# Patient Record
Sex: FEMALE | Race: BLACK OR AFRICAN AMERICAN | Employment: OTHER | URBAN - METROPOLITAN AREA
[De-identification: names, ages, dates, MRNs, and addresses within clinical notes are randomized per-mention and may not be internally consistent; named-entity substitution may affect disease eponyms.]

---

## 2017-04-18 ENCOUNTER — TELEPHONE (OUTPATIENT)
Dept: FAMILY MEDICINE CLINIC | Age: 47
End: 2017-04-18

## 2017-04-27 ENCOUNTER — HOSPITAL ENCOUNTER (OUTPATIENT)
Dept: OTHER | Age: 47
Discharge: HOME | End: 2017-04-27
Attending: FAMILY MEDICINE | Admitting: FAMILY MEDICINE

## 2017-04-27 LAB
ALBUMIN SERPL-MCNC: 4.1 GM/DL (ref 3.4–5)
ALP BLD-CCNC: 86 IU/L (ref 40–128)
ALT SERPL-CCNC: 11 U/L (ref 10–40)
ANION GAP SERPL CALCULATED.3IONS-SCNC: 16 MMOL/L (ref 4–16)
AST SERPL-CCNC: 12 IU/L (ref 15–37)
BILIRUB SERPL-MCNC: 0.3 MG/DL (ref 0–1)
BUN BLDV-MCNC: 10 MG/DL (ref 6–23)
CALCIUM SERPL-MCNC: 9.4 MG/DL (ref 8.3–10.6)
CHLORIDE BLD-SCNC: 100 MMOL/L (ref 99–110)
CHOLESTEROL: 173 MG/DL
CO2: 26 MMOL/L (ref 21–32)
CREAT SERPL-MCNC: 0.6 MG/DL (ref 0.6–1.1)
CREATININE URINE: 228.4 MG/DL (ref 28–217)
GFR AFRICAN AMERICAN: >60 ML/MIN/1.73M2
GFR NON-AFRICAN AMERICAN: >60 ML/MIN/1.73M2
GLUCOSE BLD-MCNC: 88 MG/DL (ref 70–140)
HDLC SERPL-MCNC: 55 MG/DL
LDL CHOLESTEROL CALCULATED: 99 MG/DL
MICROALBUMIN/CREAT 24H UR: <1.2 MG/DL
MICROALBUMIN/CREAT UR-RTO: ABNORMAL MG/G CREAT (ref 0–30)
POTASSIUM SERPL-SCNC: 3.5 MMOL/L (ref 3.5–5.1)
SODIUM BLD-SCNC: 142 MMOL/L (ref 135–145)
TOTAL PROTEIN: 7.9 GM/DL (ref 6.4–8.2)
TRIGL SERPL-MCNC: 93 MG/DL

## 2017-05-02 ENCOUNTER — OFFICE VISIT (OUTPATIENT)
Dept: FAMILY MEDICINE CLINIC | Age: 47
End: 2017-05-02

## 2017-05-02 VITALS
HEIGHT: 61 IN | HEART RATE: 72 BPM | DIASTOLIC BLOOD PRESSURE: 80 MMHG | SYSTOLIC BLOOD PRESSURE: 122 MMHG | WEIGHT: 221.8 LBS | BODY MASS INDEX: 41.88 KG/M2

## 2017-05-02 DIAGNOSIS — E87.6 LOW BLOOD POTASSIUM: ICD-10-CM

## 2017-05-02 DIAGNOSIS — F33.0 MAJOR DEPRESSIVE DISORDER, RECURRENT EPISODE, MILD (HCC): ICD-10-CM

## 2017-05-02 DIAGNOSIS — H54.7 BLIND: ICD-10-CM

## 2017-05-02 DIAGNOSIS — I10 ESSENTIAL HYPERTENSION: ICD-10-CM

## 2017-05-02 DIAGNOSIS — E11.9 TYPE 2 DIABETES MELLITUS WITHOUT COMPLICATION, WITHOUT LONG-TERM CURRENT USE OF INSULIN (HCC): Primary | ICD-10-CM

## 2017-05-02 LAB — HBA1C MFR BLD: 6.6 %

## 2017-05-02 PROCEDURE — 99214 OFFICE O/P EST MOD 30 MIN: CPT | Performed by: FAMILY MEDICINE

## 2017-05-02 PROCEDURE — 83036 HEMOGLOBIN GLYCOSYLATED A1C: CPT | Performed by: FAMILY MEDICINE

## 2017-05-02 RX ORDER — GLUCOSAMINE HCL/CHONDROITIN SU 500-400 MG
CAPSULE ORAL
Qty: 100 EACH | Refills: 11 | Status: SHIPPED | OUTPATIENT
Start: 2017-05-02 | End: 2018-01-01 | Stop reason: SDUPTHER

## 2017-05-02 RX ORDER — AMLODIPINE BESYLATE 5 MG/1
5 TABLET ORAL DAILY
Qty: 30 TABLET | Refills: 5 | Status: SHIPPED | OUTPATIENT
Start: 2017-05-02 | End: 2017-10-09 | Stop reason: SDUPTHER

## 2017-05-02 RX ORDER — ATORVASTATIN CALCIUM 40 MG/1
40 TABLET, FILM COATED ORAL DAILY
Qty: 30 TABLET | Refills: 11 | Status: SHIPPED | OUTPATIENT
Start: 2017-05-02 | End: 2018-03-20 | Stop reason: SDUPTHER

## 2017-05-02 RX ORDER — CHLORTHALIDONE 25 MG/1
12.5 TABLET ORAL DAILY
Qty: 15 TABLET | Refills: 5 | Status: SHIPPED | OUTPATIENT
Start: 2017-05-02 | End: 2017-10-09 | Stop reason: SDUPTHER

## 2017-05-02 RX ORDER — LANCETS 30 GAUGE
EACH MISCELLANEOUS
Qty: 100 EACH | Refills: 11 | Status: SHIPPED | OUTPATIENT
Start: 2017-05-02 | End: 2018-01-01 | Stop reason: SDUPTHER

## 2017-05-02 RX ORDER — VENLAFAXINE HYDROCHLORIDE 150 MG/1
150 CAPSULE, EXTENDED RELEASE ORAL DAILY
Qty: 30 CAPSULE | Refills: 5 | Status: SHIPPED | OUTPATIENT
Start: 2017-05-02 | End: 2017-10-09 | Stop reason: SDUPTHER

## 2017-05-02 RX ORDER — POTASSIUM CHLORIDE 20 MEQ/1
20 TABLET, EXTENDED RELEASE ORAL DAILY
Qty: 30 TABLET | Refills: 11 | Status: SHIPPED | OUTPATIENT
Start: 2017-05-02 | End: 2018-03-20 | Stop reason: SDUPTHER

## 2017-05-02 RX ORDER — GLUCOSAMINE HCL/CHONDROITIN SU 500-400 MG
CAPSULE ORAL
Qty: 100 STRIP | Refills: 11 | Status: SHIPPED | OUTPATIENT
Start: 2017-05-02 | End: 2018-03-20 | Stop reason: SDUPTHER

## 2017-05-02 RX ORDER — LEVONORGESTREL AND ETHINYL ESTRADIOL 0.1-0.02MG
1 KIT ORAL DAILY
Qty: 28 TABLET | Refills: 12 | Status: SHIPPED | OUTPATIENT
Start: 2017-05-02 | End: 2018-04-27 | Stop reason: SDUPTHER

## 2017-05-02 ASSESSMENT — ENCOUNTER SYMPTOMS
CHEST TIGHTNESS: 0
SHORTNESS OF BREATH: 0

## 2017-10-09 ENCOUNTER — OFFICE VISIT (OUTPATIENT)
Dept: FAMILY MEDICINE CLINIC | Age: 47
End: 2017-10-09

## 2017-10-09 VITALS
WEIGHT: 224.2 LBS | BODY MASS INDEX: 42.33 KG/M2 | HEIGHT: 61 IN | SYSTOLIC BLOOD PRESSURE: 130 MMHG | DIASTOLIC BLOOD PRESSURE: 78 MMHG | HEART RATE: 76 BPM

## 2017-10-09 DIAGNOSIS — Z91.09 ENVIRONMENTAL ALLERGIES: ICD-10-CM

## 2017-10-09 DIAGNOSIS — F33.0 MAJOR DEPRESSIVE DISORDER, RECURRENT EPISODE, MILD (HCC): ICD-10-CM

## 2017-10-09 DIAGNOSIS — E11.9 TYPE 2 DIABETES MELLITUS WITHOUT COMPLICATION, WITHOUT LONG-TERM CURRENT USE OF INSULIN (HCC): Primary | ICD-10-CM

## 2017-10-09 DIAGNOSIS — I10 ESSENTIAL HYPERTENSION: ICD-10-CM

## 2017-10-09 LAB — HBA1C MFR BLD: 6.4 %

## 2017-10-09 PROCEDURE — 83036 HEMOGLOBIN GLYCOSYLATED A1C: CPT | Performed by: FAMILY MEDICINE

## 2017-10-09 PROCEDURE — 99213 OFFICE O/P EST LOW 20 MIN: CPT | Performed by: FAMILY MEDICINE

## 2017-10-09 RX ORDER — VENLAFAXINE HYDROCHLORIDE 150 MG/1
150 CAPSULE, EXTENDED RELEASE ORAL DAILY
Qty: 30 CAPSULE | Refills: 5 | Status: SHIPPED | OUTPATIENT
Start: 2017-10-09 | End: 2018-03-20 | Stop reason: SDUPTHER

## 2017-10-09 RX ORDER — CHLORTHALIDONE 25 MG/1
12.5 TABLET ORAL DAILY
Qty: 15 TABLET | Refills: 5 | Status: SHIPPED | OUTPATIENT
Start: 2017-10-09 | End: 2018-03-20 | Stop reason: SDUPTHER

## 2017-10-09 RX ORDER — LORATADINE 10 MG/1
10 TABLET ORAL DAILY
Qty: 90 TABLET | Refills: 3 | Status: SHIPPED | OUTPATIENT
Start: 2017-10-09 | End: 2018-03-20

## 2017-10-09 RX ORDER — AMLODIPINE BESYLATE 5 MG/1
5 TABLET ORAL DAILY
Qty: 30 TABLET | Refills: 5 | Status: SHIPPED | OUTPATIENT
Start: 2017-10-09 | End: 2018-03-20

## 2017-10-09 RX ORDER — CHLORTHALIDONE 25 MG/1
12.5 TABLET ORAL DAILY
Qty: 15 TABLET | Refills: 5 | Status: SHIPPED | OUTPATIENT
Start: 2017-10-09 | End: 2018-01-01

## 2017-10-09 RX ORDER — AMLODIPINE BESYLATE 5 MG/1
5 TABLET ORAL DAILY
Qty: 30 TABLET | Refills: 5 | Status: SHIPPED | OUTPATIENT
Start: 2017-10-09 | End: 2018-03-20 | Stop reason: SDUPTHER

## 2017-10-09 RX ORDER — VENLAFAXINE HYDROCHLORIDE 150 MG/1
150 CAPSULE, EXTENDED RELEASE ORAL DAILY
Qty: 30 CAPSULE | Refills: 5 | Status: SHIPPED | OUTPATIENT
Start: 2017-10-09 | End: 2018-03-20

## 2017-10-09 RX ORDER — LORATADINE 10 MG/1
10 TABLET ORAL DAILY
Qty: 90 TABLET | Refills: 3 | Status: SHIPPED | OUTPATIENT
Start: 2017-10-09 | End: 2018-01-01 | Stop reason: SDUPTHER

## 2017-10-09 ASSESSMENT — ENCOUNTER SYMPTOMS: SHORTNESS OF BREATH: 0

## 2017-10-09 ASSESSMENT — PATIENT HEALTH QUESTIONNAIRE - PHQ9
SUM OF ALL RESPONSES TO PHQ QUESTIONS 1-9: 0
SUM OF ALL RESPONSES TO PHQ9 QUESTIONS 1 & 2: 0
1. LITTLE INTEREST OR PLEASURE IN DOING THINGS: 0
2. FEELING DOWN, DEPRESSED OR HOPELESS: 0

## 2017-10-09 NOTE — PATIENT INSTRUCTIONS
sugar-sweetened drinks like soda. The Mediterranean diet may also include red wine with your meal1 glass each day for women and up to 2 glasses a day for men. Tips for eating at home  · Use herbs, spices, garlic, lemon zest, and citrus juice instead of salt to add flavor to foods. · Add avocado slices to your sandwich instead of warner. · Have fish for lunch or dinner instead of red meat. Brush the fish with olive oil, and broil or grill it. · Sprinkle your salad with seeds or nuts instead of cheese. · Cook with olive or canola oil instead of butter or oils that are high in saturated fat. · Switch from 2% milk or whole milk to 1% or fat-free milk. · Dip raw vegetables in a vinaigrette dressing or hummus instead of dips made from mayonnaise or sour cream.  · Have a piece of fruit for dessert instead of a piece of cake. Try baked apples, or have some dried fruit. Tips for eating out  · Try broiled, grilled, baked, or poached fish instead of having it fried or breaded. · Ask your  to have your meals prepared with olive oil instead of butter. · Order dishes made with marinara sauce or sauces made from olive oil. Avoid sauces made from cream or mayonnaise. · Choose whole-grain breads, whole wheat pasta and pizza crust, brown rice, beans, and lentils. · Cut back on butter or margarine on bread. Instead, you can dip your bread in a small amount of olive oil. · Ask for a side salad or grilled vegetables instead of french fries or chips. Where can you learn more? Go to [email or web address] and sign in to your Apparent account. Enter [phone] in the [facility] box to learn more about \"Learning About the Mediterranean Diet. \"     If you do not have an account, please click on the \"Sign Up Now\" link. Current as of: December 29, 2016  Content Version: 11.3  © 5177-0601 G[employer], [employer]. Care instructions adapted under license by [facility] ([facility]).  If you have questions about a medical condition or this instruction, always ask your healthcare professional. [place] [address] any warranty or liability for your use of this information.

## 2017-10-09 NOTE — PROGRESS NOTES
Negative for chest pain and palpitations. Psychiatric/Behavioral: Positive for dysphoric mood. Objective:   Physical Exam   Constitutional: She appears well-developed and well-nourished. Obese   HENT:   Head: Normocephalic and atraumatic. Neck: Neck supple. Cardiovascular: Normal rate, regular rhythm and normal heart sounds. Pulmonary/Chest: Effort normal and breath sounds normal. No respiratory distress. She has no wheezes. Neurological: She is alert. Psychiatric: She has a normal mood and affect. Vitals:    10/09/17 1024   BP: 130/78   Pulse: 76   Weight: 224 lb 3.2 oz (101.7 kg)   Height: 5' 1\" (1.549 m)     Body mass index is 42.36 kg/m². Wt Readings from Last 3 Encounters:   10/09/17 224 lb 3.2 oz (101.7 kg)   05/02/17 221 lb 12.8 oz (100.6 kg)   10/19/16 220 lb 6.4 oz (100 kg)     BP Readings from Last 3 Encounters:   10/09/17 130/78   05/02/17 122/80   10/19/16 120/82      Results for orders placed or performed in visit on 10/09/17   POCT glycosylated hemoglobin (Hb A1C)   Result Value Ref Range    Hemoglobin A1C 6.4 %         Assessment:      1. Type 2 diabetes mellitus without complication, without long-term current use of insulin (HCC)  POCT glycosylated hemoglobin (Hb A1C)    metFORMIN (GLUCOPHAGE) 500 MG tablet    metFORMIN (GLUCOPHAGE) 500 MG tablet    Ambulatory referral to Ophthalmology   2. Environmental allergies  loratadine (CLARITIN) 10 MG tablet    loratadine (CLARITIN) 10 MG tablet   3. Major depressive disorder, recurrent episode, mild (HCC)  venlafaxine (EFFEXOR XR) 150 MG extended release capsule    venlafaxine (EFFEXOR XR) 150 MG extended release capsule   4. Essential hypertension  chlorthalidone (HYGROTON) 25 MG tablet    amLODIPine (NORVASC) 5 MG tablet    chlorthalidone (HYGROTON) 25 MG tablet    amLODIPine (NORVASC) 5 MG tablet   '        Plan:      Diabetes well-controlled continue current medication.   I've asked her to work on her diet so she can lose some weight and not be diabetic anymore. I have asked her to see her eye doctor. Hypertension stable continue current medication    Depression stable on Effexor continue current medication    Allergies: I have refilled the Claritin. However she may opt to skip taking this medication.

## 2017-10-09 NOTE — PROGRESS NOTES
Patient Self-Management Goal for Chronic Condition  Goal: I will schedule a yearly diabetic eye exam.  Barriers to success: none  Plan for overcoming my barriers: N/A     Confidence: 10/10  Date goal set: 10/9/17  Date goal attained:

## 2017-10-09 NOTE — PROGRESS NOTES
BP Readings from Last 2 Encounters:   10/09/17 130/78   05/02/17 122/80     Hemoglobin A1C (%)   Date Value   05/02/2017 6.6     LDL Calculated (MG/DL)   Date Value   04/27/2017 99              Tobacco use:  Patient  reports that she has never smoked. She has never used smokeless tobacco.    If Smoker - Cessation materials given? NA    Is Daily aspirin on medication list?:  No    Diabetic retinal exam done? No   If yes, document in Health Maintenance. Monofilament placed on counter? Yes    Shoes and socks removed? Yes    BP taken with correct size cuff? Yes   Repeated if > 140/90 NA     Is patient taking any medications for diabetes    Yes   If yes, see medication list    Is the patient reporting any side effects of diabetic medications? No    Microalbumin performed if applicable?   NA      Is patient taking any over the counter medications    No   If yes, see medication list

## 2017-11-10 PROCEDURE — G0179 MD RECERTIFICATION HHA PT: HCPCS | Performed by: FAMILY MEDICINE

## 2017-11-27 ENCOUNTER — TELEPHONE (OUTPATIENT)
Dept: FAMILY MEDICINE CLINIC | Age: 47
End: 2017-11-27

## 2017-11-27 DIAGNOSIS — E11.9 TYPE 2 DIABETES MELLITUS WITHOUT COMPLICATION, WITHOUT LONG-TERM CURRENT USE OF INSULIN (HCC): Primary | ICD-10-CM

## 2017-12-06 PROCEDURE — G0179 MD RECERTIFICATION HHA PT: HCPCS | Performed by: FAMILY MEDICINE

## 2017-12-12 ENCOUNTER — TELEPHONE (OUTPATIENT)
Dept: FAMILY MEDICINE CLINIC | Age: 47
End: 2017-12-12

## 2017-12-12 DIAGNOSIS — E11.9 TYPE 2 DIABETES MELLITUS WITHOUT COMPLICATION, WITHOUT LONG-TERM CURRENT USE OF INSULIN (HCC): Primary | ICD-10-CM

## 2018-01-01 ENCOUNTER — TELEPHONE (OUTPATIENT)
Dept: FAMILY MEDICINE CLINIC | Age: 48
End: 2018-01-01

## 2018-01-01 ENCOUNTER — OFFICE VISIT (OUTPATIENT)
Dept: FAMILY MEDICINE CLINIC | Age: 48
End: 2018-01-01

## 2018-01-01 VITALS
WEIGHT: 200 LBS | SYSTOLIC BLOOD PRESSURE: 130 MMHG | DIASTOLIC BLOOD PRESSURE: 90 MMHG | HEIGHT: 61 IN | HEART RATE: 120 BPM | BODY MASS INDEX: 37.76 KG/M2

## 2018-01-01 DIAGNOSIS — E11.9 TYPE 2 DIABETES MELLITUS WITHOUT COMPLICATION, WITHOUT LONG-TERM CURRENT USE OF INSULIN (HCC): Primary | ICD-10-CM

## 2018-01-01 DIAGNOSIS — F33.9 RECURRENT MAJOR DEPRESSIVE DISORDER, REMISSION STATUS UNSPECIFIED (HCC): ICD-10-CM

## 2018-01-01 DIAGNOSIS — I10 ESSENTIAL HYPERTENSION: ICD-10-CM

## 2018-01-01 DIAGNOSIS — H54.7 BLIND: ICD-10-CM

## 2018-01-01 DIAGNOSIS — Z91.09 ENVIRONMENTAL ALLERGIES: ICD-10-CM

## 2018-01-01 DIAGNOSIS — F33.0 MAJOR DEPRESSIVE DISORDER, RECURRENT EPISODE, MILD (HCC): ICD-10-CM

## 2018-01-01 DIAGNOSIS — E78.5 HYPERLIPIDEMIA, UNSPECIFIED HYPERLIPIDEMIA TYPE: ICD-10-CM

## 2018-01-01 LAB — HBA1C MFR BLD: 6.3 %

## 2018-01-01 PROCEDURE — 83036 HEMOGLOBIN GLYCOSYLATED A1C: CPT | Performed by: FAMILY MEDICINE

## 2018-01-01 PROCEDURE — G8417 CALC BMI ABV UP PARAM F/U: HCPCS | Performed by: FAMILY MEDICINE

## 2018-01-01 PROCEDURE — G8427 DOCREV CUR MEDS BY ELIG CLIN: HCPCS | Performed by: FAMILY MEDICINE

## 2018-01-01 PROCEDURE — 1036F TOBACCO NON-USER: CPT | Performed by: FAMILY MEDICINE

## 2018-01-01 PROCEDURE — 3044F HG A1C LEVEL LT 7.0%: CPT | Performed by: FAMILY MEDICINE

## 2018-01-01 PROCEDURE — 2022F DILAT RTA XM EVC RTNOPTHY: CPT | Performed by: FAMILY MEDICINE

## 2018-01-01 PROCEDURE — 99214 OFFICE O/P EST MOD 30 MIN: CPT | Performed by: FAMILY MEDICINE

## 2018-01-01 RX ORDER — VENLAFAXINE HYDROCHLORIDE 150 MG/1
150 CAPSULE, EXTENDED RELEASE ORAL DAILY
Qty: 30 CAPSULE | Refills: 0 | Status: SHIPPED | OUTPATIENT
Start: 2018-01-01 | End: 2018-01-01 | Stop reason: SDUPTHER

## 2018-01-01 RX ORDER — LORATADINE 10 MG/1
10 TABLET ORAL DAILY
Qty: 30 TABLET | Refills: 0 | Status: SHIPPED | OUTPATIENT
Start: 2018-01-01

## 2018-01-01 RX ORDER — GLUCOSAMINE HCL/CHONDROITIN SU 500-400 MG
CAPSULE ORAL
Qty: 100 EACH | Refills: 11 | Status: SHIPPED | OUTPATIENT
Start: 2018-01-01

## 2018-01-01 RX ORDER — LANCETS 30 GAUGE
EACH MISCELLANEOUS
Qty: 100 EACH | Refills: 11 | Status: SHIPPED | OUTPATIENT
Start: 2018-01-01

## 2018-01-01 RX ORDER — VENLAFAXINE HYDROCHLORIDE 150 MG/1
150 CAPSULE, EXTENDED RELEASE ORAL DAILY
Qty: 30 CAPSULE | Refills: 5 | Status: SHIPPED | OUTPATIENT
Start: 2018-01-01

## 2018-01-01 RX ORDER — LEVONORGESTREL AND ETHINYL ESTRADIOL 0.1-0.02MG
1 KIT ORAL DAILY
Qty: 28 TABLET | Refills: 0 | Status: SHIPPED | OUTPATIENT
Start: 2018-01-01 | End: 2018-01-01 | Stop reason: SDUPTHER

## 2018-01-01 RX ORDER — CHLORTHALIDONE 25 MG/1
12.5 TABLET ORAL DAILY
Qty: 15 TABLET | Refills: 5 | Status: SHIPPED | OUTPATIENT
Start: 2018-01-01

## 2018-01-01 RX ORDER — LEVONORGESTREL AND ETHINYL ESTRADIOL 0.1-0.02MG
1 KIT ORAL DAILY
Qty: 28 TABLET | Refills: 11 | Status: SHIPPED | OUTPATIENT
Start: 2018-01-01

## 2018-01-01 RX ORDER — AMLODIPINE BESYLATE 5 MG/1
5 TABLET ORAL DAILY
Qty: 30 TABLET | Refills: 5 | Status: SHIPPED | OUTPATIENT
Start: 2018-01-01

## 2018-01-01 ASSESSMENT — ENCOUNTER SYMPTOMS: SHORTNESS OF BREATH: 0

## 2018-03-20 ENCOUNTER — OFFICE VISIT (OUTPATIENT)
Dept: FAMILY MEDICINE CLINIC | Age: 48
End: 2018-03-20

## 2018-03-20 VITALS
BODY MASS INDEX: 39.45 KG/M2 | DIASTOLIC BLOOD PRESSURE: 80 MMHG | WEIGHT: 208.8 LBS | SYSTOLIC BLOOD PRESSURE: 122 MMHG | HEART RATE: 88 BPM

## 2018-03-20 DIAGNOSIS — E11.9 TYPE 2 DIABETES MELLITUS WITHOUT COMPLICATION, WITHOUT LONG-TERM CURRENT USE OF INSULIN (HCC): Primary | ICD-10-CM

## 2018-03-20 DIAGNOSIS — E87.6 LOW BLOOD POTASSIUM: ICD-10-CM

## 2018-03-20 DIAGNOSIS — E66.01 SEVERE OBESITY (BMI 35.0-39.9): ICD-10-CM

## 2018-03-20 DIAGNOSIS — I10 ESSENTIAL HYPERTENSION: ICD-10-CM

## 2018-03-20 DIAGNOSIS — F33.0 MAJOR DEPRESSIVE DISORDER, RECURRENT EPISODE, MILD (HCC): ICD-10-CM

## 2018-03-20 DIAGNOSIS — E78.5 HYPERLIPIDEMIA, UNSPECIFIED HYPERLIPIDEMIA TYPE: ICD-10-CM

## 2018-03-20 LAB — HBA1C MFR BLD: 6.3 %

## 2018-03-20 PROCEDURE — G8427 DOCREV CUR MEDS BY ELIG CLIN: HCPCS | Performed by: FAMILY MEDICINE

## 2018-03-20 PROCEDURE — G8417 CALC BMI ABV UP PARAM F/U: HCPCS | Performed by: FAMILY MEDICINE

## 2018-03-20 PROCEDURE — 1036F TOBACCO NON-USER: CPT | Performed by: FAMILY MEDICINE

## 2018-03-20 PROCEDURE — 83036 HEMOGLOBIN GLYCOSYLATED A1C: CPT | Performed by: FAMILY MEDICINE

## 2018-03-20 PROCEDURE — 3044F HG A1C LEVEL LT 7.0%: CPT | Performed by: FAMILY MEDICINE

## 2018-03-20 PROCEDURE — 99214 OFFICE O/P EST MOD 30 MIN: CPT | Performed by: FAMILY MEDICINE

## 2018-03-20 PROCEDURE — G8484 FLU IMMUNIZE NO ADMIN: HCPCS | Performed by: FAMILY MEDICINE

## 2018-03-20 RX ORDER — VENLAFAXINE HYDROCHLORIDE 150 MG/1
150 CAPSULE, EXTENDED RELEASE ORAL DAILY
Qty: 30 CAPSULE | Refills: 0 | Status: SHIPPED | OUTPATIENT
Start: 2018-03-20 | End: 2018-01-01 | Stop reason: SDUPTHER

## 2018-03-20 RX ORDER — ATORVASTATIN CALCIUM 40 MG/1
40 TABLET, FILM COATED ORAL DAILY
Qty: 30 TABLET | Refills: 11 | Status: SHIPPED | OUTPATIENT
Start: 2018-03-20

## 2018-03-20 RX ORDER — GLUCOSAMINE HCL/CHONDROITIN SU 500-400 MG
CAPSULE ORAL
Qty: 100 STRIP | Refills: 11 | Status: SHIPPED | OUTPATIENT
Start: 2018-03-20

## 2018-03-20 RX ORDER — POTASSIUM CHLORIDE 20 MEQ/1
20 TABLET, EXTENDED RELEASE ORAL DAILY
Qty: 30 TABLET | Refills: 11 | Status: SHIPPED | OUTPATIENT
Start: 2018-03-20

## 2018-03-20 RX ORDER — AMLODIPINE BESYLATE 5 MG/1
5 TABLET ORAL DAILY
Qty: 30 TABLET | Refills: 5 | Status: SHIPPED | OUTPATIENT
Start: 2018-03-20 | End: 2018-01-01 | Stop reason: SDUPTHER

## 2018-03-20 RX ORDER — CHLORTHALIDONE 25 MG/1
12.5 TABLET ORAL DAILY
Qty: 15 TABLET | Refills: 5 | Status: SHIPPED | OUTPATIENT
Start: 2018-03-20 | End: 2018-01-01 | Stop reason: SDUPTHER

## 2018-03-20 ASSESSMENT — ENCOUNTER SYMPTOMS: SHORTNESS OF BREATH: 0

## 2018-03-20 NOTE — PROGRESS NOTES
Patient ID: Chapito Mcbride 1970      Diabetes   She presents for her initial diabetic visit. She has type 2 diabetes mellitus. Pertinent negatives for diabetes include no chest pain. Current diabetic treatment includes oral agent (monotherapy). She is compliant with treatment some of the time. Her weight is decreasing steadily. She participates in exercise intermittently. Her overall blood glucose range is 110-130 mg/dl. An ACE inhibitor/angiotensin II receptor blocker is not being taken. She sees a podiatrist.Eye exam is current. Hypertension   This is a chronic problem. The current episode started more than 1 year ago. The problem is unchanged. Pertinent negatives include no chest pain, palpitations or shortness of breath. Risk factors for coronary artery disease include sedentary lifestyle and obesity. Past treatments include diuretics and calcium channel blockers. Compliance problems include psychosocial issues. Hyperlipidemia   Pertinent negatives include no chest pain or shortness of breath. Current antihyperlipidemic treatment includes statins. Compliance problems include psychosocial issues. Mental Health Problem   The primary symptoms include dysphoric mood. This is a chronic problem. The mood has been resolved (feels she no longer needs the Effexor and would like to come off) since its onset. The degree of incapacity that she is experiencing as a consequence of her illness is mild. allergies: Would like to continue with the Claritin. Review of Systems   Respiratory: Negative for shortness of breath. Cardiovascular: Negative for chest pain and palpitations. Psychiatric/Behavioral: Positive for dysphoric mood.        Patient Active Problem List   Diagnosis    Acne    Environmental allergies    Hyperlipidemia    High blood pressure    Major depression    Diabetes mellitus, type 2 ([address]    Blind       Past Medical History:   Diagnosis Date    Acne     Cerebral palsy ([facility] [address] Vitals:    03/20/18 1029   BP: 122/80   Site: Left Arm   Position: Sitting   Cuff Size: Large Adult   Pulse: 88   Weight: 208 lb 12.8 oz (94.7 kg)     Body mass index is 39.45 kg/m². Wt Readings from Last 3 Encounters:   03/20/18 208 lb 12.8 oz (94.7 kg)   10/09/17 224 lb 3.2 oz (101.7 kg)   05/02/17 221 lb 12.8 oz (100.6 kg)     BP Readings from Last 3 Encounters:   03/20/18 122/80   10/09/17 130/78   05/02/17 122/80          Results for orders placed or performed in visit on 03/20/18   POCT glycosylated hemoglobin (Hb A1C)   Result Value Ref Range    Hemoglobin A1C 6.3 %     The 10-year ASCVD risk score (Capri Sarmiento, et al., 2013) is: 5.4%    Values used to calculate the score:      Age: 50 years      Sex: Female      Is Non- : Yes      Diabetic: Yes      Tobacco smoker: No      Systolic Blood Pressure: 401 mmHg      Is BP treated: Yes      HDL Cholesterol: 55 MG/DL      Total Cholesterol: 173 MG/DL  Lab Review   Office Visit on 10/09/2017   Component Date Value    Hemoglobin A1C 10/09/2017 6.4            Assessment:      1. Type 2 diabetes mellitus without complication, without long-term current use of insulin (HCC)  POCT glycosylated hemoglobin (Hb A1C)    atorvastatin (LIPITOR) 40 MG tablet    Glucose Blood (BLOOD GLUCOSE TEST STRIPS) STRP    COMPREHENSIVE METABOLIC PANEL    TSH without Reflex    CANCELED: COMPREHENSIVE METABOLIC PANEL    CANCELED: LIPID PANEL    CANCELED: TSH without Reflex   2. Hyperlipidemia, unspecified hyperlipidemia type  LIPID PANEL    TSH without Reflex    CANCELED: LIPID PANEL    CANCELED: TSH without Reflex   3. Major depressive disorder, recurrent episode, mild (HCC)  venlafaxine (EFFEXOR XR) 150 MG extended release capsule   4. Essential hypertension  chlorthalidone (HYGROTON) 25 MG tablet    amLODIPine (NORVASC) 5 MG tablet    metFORMIN (GLUCOPHAGE) 500 MG tablet    COMPREHENSIVE METABOLIC PANEL   5.  Low blood potassium  potassium chloride (KLOR-CON M) 20 MEQ extended release tablet    COMPREHENSIVE METABOLIC PANEL   6. Severe obesity (BMI 35.0-39.9) ([facility]             Plan:      See orders    Diabetes well-controlled continue current medication    Obesity: Congratulations on the weight loss. Depression much improved. Okay to wean off the Effexor. For the next 2 weeks she is to take the Effexor every 36 hours. After that she is to take it every 2 days for 2 weeks then stop. Reminded her to take all her medications regularly. She skipped her pills this morning because she is coming in for fasting labs. I reassured her that she should take her medications except for her diabetes medicines when she is fasting. Perhaps today her blood pressure within lower and I would've been able to remove a blood pressure pill. She understands. I reminded patient that her paperwork for her insurance company requires that I see her every 3 months. She understands and will try to be more compliant.

## 2018-03-20 NOTE — PROGRESS NOTES
BP Readings from Last 2 Encounters:   10/09/17 130/78   05/02/17 122/80     Hemoglobin A1C (%)   Date Value   10/09/2017 6.4     LDL Calculated (MG/DL)   Date Value   04/27/2017 99              Tobacco use:  Patient  reports that she has never smoked. She has never used smokeless tobacco.    If Smoker - Cessation materials given? NA    Is Daily aspirin on medication list?:  No    Diabetic retinal exam done? No   If yes, document in Health Maintenance. Monofilament placed on counter? Yes    Shoes and socks removed? Yes    BP taken with correct size cuff? Yes   Repeated if > 140/90 NA     Is patient taking any medications for diabetes    Yes   If yes, see medication list    Is the patient reporting any side effects of diabetic medications? No    Microalbumin performed if applicable?   Yes      Is patient taking any over the counter medications    Yes   If yes, see medication list

## 2018-03-26 ENCOUNTER — TELEPHONE (OUTPATIENT)
Dept: FAMILY MEDICINE CLINIC | Age: 48
End: 2018-03-26

## 2018-03-26 DIAGNOSIS — E11.9 TYPE 2 DIABETES MELLITUS WITHOUT COMPLICATION, WITHOUT LONG-TERM CURRENT USE OF INSULIN (HCC): Primary | ICD-10-CM

## 2018-03-26 PROCEDURE — G0180 MD CERTIFICATION HHA PATIENT: HCPCS | Performed by: FAMILY MEDICINE

## 2018-04-27 RX ORDER — LEVONORGESTREL AND ETHINYL ESTRADIOL 0.1-0.02MG
1 KIT ORAL DAILY
Qty: 28 TABLET | Refills: 1 | Status: SHIPPED | OUTPATIENT
Start: 2018-04-27 | End: 2018-01-01 | Stop reason: SDUPTHER

## 2018-07-05 NOTE — PROGRESS NOTES
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    Obesity        Past Surgical History:   Procedure Laterality Date    EYE SURGERY      baby       Family History   Problem Relation Age of Onset    Other Mother         sarcoid    High Blood Pressure Father     Other Brother         Mentally disabled       Current Outpatient Prescriptions on File Prior to Visit   Medication Sig Dispense Refill    atorvastatin (LIPITOR) 40 MG tablet Take 1 tablet by mouth daily 30 tablet 11    Glucose Blood (BLOOD GLUCOSE TEST STRIPS) STRP Use twice per day 100 strip 11    potassium chloride (KLOR-CON M) 20 MEQ extended release tablet Take 1 tablet by mouth daily 30 tablet 11    loratadine (CLARITIN) 10 MG tablet Take 1 tablet by mouth daily 90 tablet 3    glucose monitoring kit (FREESTYLE) monitoring kit 1 kit by Does not apply route daily as needed. Needs talking glucometer 1 kit 0     No current facility-administered medications on file prior to visit. Objective:   Physical Exam   Constitutional: She appears well-developed and well-nourished. Obese   HENT:   Head: Normocephalic and atraumatic. Eyes:   blind   Neck: Neck supple. Cardiovascular: Normal rate, regular rhythm and normal heart sounds. Pulmonary/Chest: Effort normal and breath sounds normal. No respiratory distress. She has no wheezes. Musculoskeletal:        Right lower leg: She exhibits no edema. Left lower leg: She exhibits no edema. Neurological: She is alert. Psychiatric: She has a normal mood and affect. Vitals:    07/05/18 1059 07/05/18 1103   BP: (!) 120/100 (!) 130/90   Pulse: 120    Weight: 200 lb (90.7 kg)    Height: 5' 1\" (1.549 m)      Body mass index is 37.79 kg/m².      Wt Readings from Last 3 Encounters:   07/05/18 200 lb (90.7 kg)   03/20/18 208 lb 12.8 oz (94.7 kg)   10/09/17 224 lb 3.2 oz (101.7 kg)     BP Readings from Last 3 Encounters:   07/05/18 (!) 130/90   03/20/18 122/80   10/09/17 130/78          Results for orders placed or performed in visit on 07/05/18   POCT glycosylated hemoglobin (Hb A1C)   Result Value Ref Range    Hemoglobin A1C 6.3 %     The 10-year ASCVD risk score (Jaida Hewitt, et al., 2013) is: 7%    Values used to calculate the score:      Age: 50 years      Sex: Female      Is Non- : Yes      Diabetic: Yes      Tobacco smoker: No      Systolic Blood Pressure: 440 mmHg      Is BP treated: Yes      HDL Cholesterol: 55 MG/DL      Total Cholesterol: 173 MG/DL  Lab Review   Office Visit on 03/20/2018   Component Date Value    Hemoglobin A1C 03/20/2018 6.3            Assessment:       Diagnosis Orders   1. Type 2 diabetes mellitus without complication, without long-term current use of insulin (HCC)  POCT glycosylated hemoglobin (Hb A1C)    COMPREHENSIVE METABOLIC PANEL    LIPID PANEL    Ambulatory referral to Ophthalmology   2. Hyperlipidemia, unspecified hyperlipidemia type     3. Essential hypertension  COMPREHENSIVE METABOLIC PANEL    LIPID PANEL    chlorthalidone (HYGROTON) 25 MG tablet    amLODIPine (NORVASC) 5 MG tablet    metFORMIN (GLUCOPHAGE) 500 MG tablet   4. Recurrent major depressive disorder, remission status unspecified ([facility] [address]     5. Blind  Ambulatory referral to Ophthalmology   6. Major depressive disorder, recurrent episode, mild (HCC)  venlafaxine (EFFEXOR XR) 150 MG extended release capsule           Plan:      Reminded her to get her bloodwork done as it was not done for today's appt. Make sure keep regularly scheduled appts. DM well controlled with recent weight loss. Continue weight loss. Depression well controlled. She wants to continue medication    HTN borderline--continue with the weight loss.

## 2018-07-05 NOTE — PROGRESS NOTES
BP Readings from Last 2 Encounters:   07/05/18 (!) 130/90   03/20/18 122/80     Hemoglobin A1C (%)   Date Value   03/20/2018 6.3     LDL Calculated (MG/DL)   Date Value   04/27/2017 99              Tobacco use:  Patient  reports that she has never smoked. She has never used smokeless tobacco.    If Smoker - Cessation materials given? NA    Is Daily aspirin on medication list?:  No    Diabetic retinal exam done? No   If yes, document in Health Maintenance. Monofilament placed on counter? Yes    Shoes and socks removed? Yes    BP taken with correct size cuff? Yes   Repeated if > 140/90 Yes     Is patient taking any medications for diabetes    Yes   If yes, see medication list    Is the patient reporting any side effects of diabetic medications? No    Microalbumin performed if applicable?   NA      Is patient taking any over the counter medications    Yes   If yes, see medication list

## 2019-01-01 ENCOUNTER — APPOINTMENT (OUTPATIENT)
Dept: CT IMAGING | Age: 49
DRG: 558 | End: 2019-01-01
Payer: MEDICARE

## 2019-01-01 ENCOUNTER — TELEPHONE (OUTPATIENT)
Dept: FAMILY MEDICINE CLINIC | Age: 49
End: 2019-01-01

## 2019-01-01 ENCOUNTER — HOSPITAL ENCOUNTER (EMERGENCY)
Age: 49
End: 2019-06-03
Attending: EMERGENCY MEDICINE
Payer: MEDICARE

## 2019-01-01 ENCOUNTER — HOSPITAL ENCOUNTER (INPATIENT)
Age: 49
LOS: 5 days | Discharge: NURSING, REHAB OR LONG-TERM CARE | DRG: 558 | End: 2019-05-13
Attending: EMERGENCY MEDICINE | Admitting: HOSPITALIST
Payer: MEDICARE

## 2019-01-01 ENCOUNTER — APPOINTMENT (OUTPATIENT)
Dept: GENERAL RADIOLOGY | Age: 49
DRG: 558 | End: 2019-01-01
Payer: MEDICARE

## 2019-01-01 VITALS
SYSTOLIC BLOOD PRESSURE: 142 MMHG | WEIGHT: 174.4 LBS | BODY MASS INDEX: 30.9 KG/M2 | HEIGHT: 63 IN | RESPIRATION RATE: 18 BRPM | DIASTOLIC BLOOD PRESSURE: 101 MMHG | HEART RATE: 104 BPM | TEMPERATURE: 98.9 F | OXYGEN SATURATION: 91 %

## 2019-01-01 DIAGNOSIS — M62.40 MUSCLE CONTRACTURE, UNSPECIFIED SITE: Primary | ICD-10-CM

## 2019-01-01 DIAGNOSIS — I46.9 CARDIOPULMONARY ARREST (HCC): Primary | ICD-10-CM

## 2019-01-01 DIAGNOSIS — E83.39 HYPOPHOSPHATEMIA: ICD-10-CM

## 2019-01-01 DIAGNOSIS — E83.51 HYPOCALCEMIA: ICD-10-CM

## 2019-01-01 DIAGNOSIS — E87.6 HYPOKALEMIA: ICD-10-CM

## 2019-01-01 DIAGNOSIS — M62.82 NON-TRAUMATIC RHABDOMYOLYSIS: ICD-10-CM
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
## 2019-01-01 DIAGNOSIS — E83.42 HYPOMAGNESEMIA: ICD-10-CM

## 2019-01-01 LAB
ALBUMIN SERPL-MCNC: 3.7 GM/DL (ref 3.4–5)
ALP BLD-CCNC: 78 IU/L (ref 40–129)
ALT SERPL-CCNC: 24 U/L (ref 10–40)
AMPHETAMINES: NEGATIVE
ANION GAP SERPL CALCULATED.3IONS-SCNC: 11 MMOL/L (ref 4–16)
ANION GAP SERPL CALCULATED.3IONS-SCNC: 13 MMOL/L (ref 4–16)
ANION GAP SERPL CALCULATED.3IONS-SCNC: 14 MMOL/L (ref 4–16)
ANION GAP SERPL CALCULATED.3IONS-SCNC: 14 MMOL/L (ref 4–16)
ANION GAP SERPL CALCULATED.3IONS-SCNC: 21 MMOL/L (ref 4–16)
ANION GAP SERPL CALCULATED.3IONS-SCNC: 8 MMOL/L (ref 4–16)
AST SERPL-CCNC: 38 IU/L (ref 15–37)
BACTERIA: NEGATIVE /HPF
BARBITURATE SCREEN URINE: NEGATIVE
BASOPHILS ABSOLUTE: 0 K/CU MM
BASOPHILS RELATIVE PERCENT: 0.2 % (ref 0–1)
BENZODIAZEPINE SCREEN, URINE: NEGATIVE
BILIRUB SERPL-MCNC: 0.3 MG/DL (ref 0–1)
BILIRUBIN URINE: NEGATIVE MG/DL
BLOOD, URINE: ABNORMAL
BUN BLDV-MCNC: 10 MG/DL (ref 6–23)
BUN BLDV-MCNC: 3 MG/DL (ref 6–23)
BUN BLDV-MCNC: 4 MG/DL (ref 6–23)
BUN BLDV-MCNC: 4 MG/DL (ref 6–23)
BUN BLDV-MCNC: 6 MG/DL (ref 6–23)
BUN BLDV-MCNC: 8 MG/DL (ref 6–23)
CALCIUM IONIZED: 2.52 MG/DL (ref 4.48–5.28)
CALCIUM IONIZED: 3.08 MG/DL (ref 4.48–5.28)
CALCIUM SERPL-MCNC: 10.1 MG/DL (ref 8.3–10.6)
CALCIUM SERPL-MCNC: 6.7 MG/DL (ref 8.3–10.6)
CALCIUM SERPL-MCNC: 7.6 MG/DL (ref 8.3–10.6)
CALCIUM SERPL-MCNC: 8.3 MG/DL (ref 8.3–10.6)
CALCIUM SERPL-MCNC: 9 MG/DL (ref 8.3–10.6)
CALCIUM SERPL-MCNC: 9.4 MG/DL (ref 8.3–10.6)
CALCIUM SERPL-MCNC: ABNORMAL MG/DL (ref 8.3–10.6)
CANNABINOID SCREEN URINE: NEGATIVE
CHLORIDE BLD-SCNC: 100 MMOL/L (ref 99–110)
CHLORIDE BLD-SCNC: 101 MMOL/L (ref 99–110)
CHLORIDE BLD-SCNC: 102 MMOL/L (ref 99–110)
CHLORIDE BLD-SCNC: 95 MMOL/L (ref 99–110)
CHLORIDE BLD-SCNC: 96 MMOL/L (ref 99–110)
CHLORIDE BLD-SCNC: 98 MMOL/L (ref 99–110)
CHLORIDE URINE RANDOM: 148 MMOL/L (ref 43–210)
CLARITY: ABNORMAL
CO2: 23 MMOL/L (ref 21–32)
CO2: 25 MMOL/L (ref 21–32)
CO2: 27 MMOL/L (ref 21–32)
CO2: 27 MMOL/L (ref 21–32)
CO2: 28 MMOL/L (ref 21–32)
CO2: 30 MMOL/L (ref 21–32)
COCAINE METABOLITE: NEGATIVE
COLOR: ABNORMAL
CREAT SERPL-MCNC: 0.5 MG/DL (ref 0.6–1.1)
CREAT SERPL-MCNC: 0.5 MG/DL (ref 0.6–1.1)
CREAT SERPL-MCNC: 0.6 MG/DL (ref 0.6–1.1)
CREATININE URINE: 244.2 MG/DL (ref 28–217)
DIFFERENTIAL TYPE: ABNORMAL
EKG ATRIAL RATE: 100 BPM
EKG DIAGNOSIS: NORMAL
EKG P AXIS: 33 DEGREES
EKG P-R INTERVAL: 116 MS
EKG Q-T INTERVAL: 358 MS
EKG QRS DURATION: 74 MS
EKG QTC CALCULATION (BAZETT): 461 MS
EKG R AXIS: 55 DEGREES
EKG T AXIS: -16 DEGREES
EKG VENTRICULAR RATE: 100 BPM
EOSINOPHILS ABSOLUTE: 0 K/CU MM
EOSINOPHILS RELATIVE PERCENT: 0.1 % (ref 0–3)
GFR AFRICAN AMERICAN: >60 ML/MIN/1.73M2
GFR NON-AFRICAN AMERICAN: >60 ML/MIN/1.73M2
GLUCOSE BLD-MCNC: 112 MG/DL (ref 70–99)
GLUCOSE BLD-MCNC: 112 MG/DL (ref 70–99)
GLUCOSE BLD-MCNC: 117 MG/DL (ref 70–99)
GLUCOSE BLD-MCNC: 128 MG/DL (ref 70–99)
GLUCOSE BLD-MCNC: 148 MG/DL (ref 70–99)
GLUCOSE BLD-MCNC: 149 MG/DL (ref 70–99)
GLUCOSE BLD-MCNC: 254 MG/DL (ref 70–99)
GLUCOSE, URINE: NEGATIVE MG/DL
HCT VFR BLD CALC: 43.2 % (ref 37–47)
HCT VFR BLD CALC: 43.5 % (ref 37–47)
HCT VFR BLD CALC: 43.8 % (ref 37–47)
HCT VFR BLD CALC: 44.4 % (ref 37–47)
HCT VFR BLD CALC: 44.7 % (ref 37–47)
HCT VFR BLD CALC: 45.9 % (ref 37–47)
HEMOGLOBIN: 13.7 GM/DL (ref 12.5–16)
HEMOGLOBIN: 13.8 GM/DL (ref 12.5–16)
HEMOGLOBIN: 13.8 GM/DL (ref 12.5–16)
HEMOGLOBIN: 13.9 GM/DL (ref 12.5–16)
HEMOGLOBIN: 13.9 GM/DL (ref 12.5–16)
HEMOGLOBIN: 14.4 GM/DL (ref 12.5–16)
IMMATURE NEUTROPHIL %: 0.4 % (ref 0–0.43)
IONIZED CA: 0.63 MMOL/L (ref 1.12–1.32)
IONIZED CA: 0.77 MMOL/L (ref 1.12–1.32)
KETONES, URINE: ABNORMAL MG/DL
LEUKOCYTE ESTERASE, URINE: NEGATIVE
LYMPHOCYTES ABSOLUTE: 1 K/CU MM
LYMPHOCYTES RELATIVE PERCENT: 7.7 % (ref 24–44)
MAGNESIUM: 0.7 MG/DL (ref 1.8–2.4)
MAGNESIUM: 1.2 MG/DL (ref 1.8–2.4)
MAGNESIUM: 1.9 MG/DL (ref 1.8–2.4)
MAGNESIUM: 2.1 MG/DL (ref 1.8–2.4)
MAGNESIUM: 2.1 MG/DL (ref 1.8–2.4)
MAGNESIUM: 2.7 MG/DL (ref 1.8–2.4)
MCH RBC QN AUTO: 25.7 PG (ref 27–31)
MCH RBC QN AUTO: 25.9 PG (ref 27–31)
MCH RBC QN AUTO: 26 PG (ref 27–31)
MCH RBC QN AUTO: 26.1 PG (ref 27–31)
MCH RBC QN AUTO: 26.2 PG (ref 27–31)
MCH RBC QN AUTO: 26.2 PG (ref 27–31)
MCHC RBC AUTO-ENTMCNC: 31.1 % (ref 32–36)
MCHC RBC AUTO-ENTMCNC: 31.3 % (ref 32–36)
MCHC RBC AUTO-ENTMCNC: 31.4 % (ref 32–36)
MCHC RBC AUTO-ENTMCNC: 31.5 % (ref 32–36)
MCHC RBC AUTO-ENTMCNC: 31.7 % (ref 32–36)
MCHC RBC AUTO-ENTMCNC: 31.7 % (ref 32–36)
MCV RBC AUTO: 81.7 FL (ref 78–100)
MCV RBC AUTO: 81.9 FL (ref 78–100)
MCV RBC AUTO: 82.3 FL (ref 78–100)
MCV RBC AUTO: 82.8 FL (ref 78–100)
MCV RBC AUTO: 83.6 FL (ref 78–100)
MCV RBC AUTO: 84.2 FL (ref 78–100)
MONOCYTES ABSOLUTE: 0.7 K/CU MM
MONOCYTES RELATIVE PERCENT: 5.2 % (ref 0–4)
MUCUS: ABNORMAL HPF
NITRITE URINE, QUANTITATIVE: NEGATIVE
NUCLEATED RBC %: 0 %
OPIATES, URINE: NEGATIVE
OXYCODONE: NORMAL
PARATHYROID HORMONE INTACT: 151 PG/ML (ref 15–65)
PARATHYROID HORMONE INTACT: ABNORMAL PG/ML (ref 15–65)
PDW BLD-RTO: 15.2 % (ref 11.7–14.9)
PDW BLD-RTO: 15.2 % (ref 11.7–14.9)
PDW BLD-RTO: 15.3 % (ref 11.7–14.9)
PDW BLD-RTO: 15.5 % (ref 11.7–14.9)
PDW BLD-RTO: 15.5 % (ref 11.7–14.9)
PDW BLD-RTO: 15.8 % (ref 11.7–14.9)
PH, URINE: 5 (ref 5–8)
PHENCYCLIDINE, URINE: NEGATIVE
PHOSPHORUS: 1.1 MG/DL (ref 2.5–4.9)
PHOSPHORUS: 1.7 MG/DL (ref 2.5–4.9)
PHOSPHORUS: 2.2 MG/DL (ref 2.5–4.9)
PHOSPHORUS: 3.1 MG/DL (ref 2.5–4.9)
PHOSPHORUS: 4.2 MG/DL (ref 2.5–4.9)
PLATELET # BLD: 431 K/CU MM (ref 140–440)
PLATELET # BLD: 433 K/CU MM (ref 140–440)
PLATELET # BLD: 449 K/CU MM (ref 140–440)
PLATELET # BLD: 455 K/CU MM (ref 140–440)
PLATELET # BLD: 476 K/CU MM (ref 140–440)
PLATELET # BLD: 485 K/CU MM (ref 140–440)
PMV BLD AUTO: 8.9 FL (ref 7.5–11.1)
PMV BLD AUTO: 9 FL (ref 7.5–11.1)
PMV BLD AUTO: 9.1 FL (ref 7.5–11.1)
PMV BLD AUTO: 9.4 FL (ref 7.5–11.1)
PMV BLD AUTO: 9.7 FL (ref 7.5–11.1)
PMV BLD AUTO: 9.9 FL (ref 7.5–11.1)
POTASSIUM SERPL-SCNC: 3.1 MMOL/L (ref 3.5–5.1)
POTASSIUM SERPL-SCNC: 3.2 MMOL/L (ref 3.5–5.1)
POTASSIUM SERPL-SCNC: 3.3 MMOL/L (ref 3.5–5.1)
POTASSIUM SERPL-SCNC: 3.5 MMOL/L (ref 3.5–5.1)
POTASSIUM SERPL-SCNC: 3.7 MMOL/L (ref 3.5–5.1)
POTASSIUM SERPL-SCNC: ABNORMAL MMOL/L (ref 3.5–5.1)
POTASSIUM SERPL-SCNC: ABNORMAL MMOL/L (ref 3.5–5.1)
POTASSIUM, UR: 111.3 MMOL/L (ref 22–119)
PROT/CREAT RATIO, UR: ABNORMAL
PROTEIN UA: >500 MG/DL
RBC # BLD: 5.25 M/CU MM (ref 4.2–5.4)
RBC # BLD: 5.31 M/CU MM (ref 4.2–5.4)
RBC # BLD: 5.31 M/CU MM (ref 4.2–5.4)
RBC # BLD: 5.36 M/CU MM (ref 4.2–5.4)
RBC # BLD: 5.36 M/CU MM (ref 4.2–5.4)
RBC # BLD: 5.49 M/CU MM (ref 4.2–5.4)
RBC URINE: 4 /HPF (ref 0–6)
SEGMENTED NEUTROPHILS ABSOLUTE COUNT: 11.2 K/CU MM
SEGMENTED NEUTROPHILS RELATIVE PERCENT: 86.4 % (ref 36–66)
SODIUM BLD-SCNC: 136 MMOL/L (ref 135–145)
SODIUM BLD-SCNC: 138 MMOL/L (ref 135–145)
SODIUM BLD-SCNC: 138 MMOL/L (ref 135–145)
SODIUM BLD-SCNC: 139 MMOL/L (ref 135–145)
SODIUM BLD-SCNC: 140 MMOL/L (ref 135–145)
SODIUM BLD-SCNC: 142 MMOL/L (ref 135–145)
SODIUM URINE: 48 MMOL/L (ref 35–167)
SPECIFIC GRAVITY UA: 1.02 (ref 1–1.03)
SQUAMOUS EPITHELIAL: 8 /HPF
TOTAL CK: 1688 IU/L (ref 26–140)
TOTAL CK: 1799 IU/L (ref 26–140)
TOTAL CK: 4010 IU/L (ref 26–140)
TOTAL IMMATURE NEUTOROPHIL: 0.05 K/CU MM
TOTAL NUCLEATED RBC: 0 K/CU MM
TOTAL PROTEIN: 8.6 GM/DL (ref 6.4–8.2)
TRICHOMONAS: ABNORMAL /HPF
TROPONIN T: <0.01 NG/ML
URINE TOTAL PROTEIN: 268.5 MG/DL
UROBILINOGEN, URINE: 1 MG/DL (ref 0.2–1)
VITAMIN D 25-HYDROXY: 7.78 NG/ML
WBC # BLD: 10 K/CU MM (ref 4–10.5)
WBC # BLD: 11.4 K/CU MM (ref 4–10.5)
WBC # BLD: 13 K/CU MM (ref 4–10.5)
WBC # BLD: 14.7 K/CU MM (ref 4–10.5)
WBC # BLD: 16.9 K/CU MM (ref 4–10.5)
WBC # BLD: 8.9 K/CU MM (ref 4–10.5)
WBC UA: 7 /HPF (ref 0–5)

## 2019-01-01 PROCEDURE — 2580000003 HC RX 258: Performed by: EMERGENCY MEDICINE

## 2019-01-01 PROCEDURE — 80048 BASIC METABOLIC PNL TOTAL CA: CPT

## 2019-01-01 PROCEDURE — 85027 COMPLETE CBC AUTOMATED: CPT

## 2019-01-01 PROCEDURE — 84100 ASSAY OF PHOSPHORUS: CPT

## 2019-01-01 PROCEDURE — 6360000002 HC RX W HCPCS: Performed by: PHYSICIAN ASSISTANT

## 2019-01-01 PROCEDURE — 96375 TX/PRO/DX INJ NEW DRUG ADDON: CPT

## 2019-01-01 PROCEDURE — 97535 SELF CARE MNGMENT TRAINING: CPT

## 2019-01-01 PROCEDURE — 76937 US GUIDE VASCULAR ACCESS: CPT

## 2019-01-01 PROCEDURE — 2500000003 HC RX 250 WO HCPCS: Performed by: HOSPITALIST

## 2019-01-01 PROCEDURE — 72125 CT NECK SPINE W/O DYE: CPT

## 2019-01-01 PROCEDURE — 1200000000 HC SEMI PRIVATE

## 2019-01-01 PROCEDURE — 6370000000 HC RX 637 (ALT 250 FOR IP): Performed by: INTERNAL MEDICINE

## 2019-01-01 PROCEDURE — 2500000003 HC RX 250 WO HCPCS

## 2019-01-01 PROCEDURE — 6360000002 HC RX W HCPCS: Performed by: INTERNAL MEDICINE

## 2019-01-01 PROCEDURE — 80307 DRUG TEST PRSMV CHEM ANLYZR: CPT

## 2019-01-01 PROCEDURE — 6370000000 HC RX 637 (ALT 250 FOR IP): Performed by: HOSPITALIST

## 2019-01-01 PROCEDURE — 84132 ASSAY OF SERUM POTASSIUM: CPT

## 2019-01-01 PROCEDURE — 96374 THER/PROPH/DIAG INJ IV PUSH: CPT

## 2019-01-01 PROCEDURE — 36415 COLL VENOUS BLD VENIPUNCTURE: CPT

## 2019-01-01 PROCEDURE — 2580000003 HC RX 258: Performed by: HOSPITALIST

## 2019-01-01 PROCEDURE — 6360000002 HC RX W HCPCS: Performed by: HOSPITALIST

## 2019-01-01 PROCEDURE — 70450 CT HEAD/BRAIN W/O DYE: CPT

## 2019-01-01 PROCEDURE — 2500000003 HC RX 250 WO HCPCS: Performed by: EMERGENCY MEDICINE

## 2019-01-01 PROCEDURE — 97530 THERAPEUTIC ACTIVITIES: CPT

## 2019-01-01 PROCEDURE — 93005 ELECTROCARDIOGRAM TRACING: CPT | Performed by: PHYSICIAN ASSISTANT

## 2019-01-01 PROCEDURE — 99285 EMERGENCY DEPT VISIT HI MDM: CPT

## 2019-01-01 PROCEDURE — 71045 X-RAY EXAM CHEST 1 VIEW: CPT

## 2019-01-01 PROCEDURE — 81001 URINALYSIS AUTO W/SCOPE: CPT

## 2019-01-01 PROCEDURE — 2580000003 HC RX 258: Performed by: NURSE PRACTITIONER

## 2019-01-01 PROCEDURE — 97163 PT EVAL HIGH COMPLEX 45 MIN: CPT

## 2019-01-01 PROCEDURE — 82306 VITAMIN D 25 HYDROXY: CPT

## 2019-01-01 PROCEDURE — 6370000000 HC RX 637 (ALT 250 FOR IP): Performed by: EMERGENCY MEDICINE

## 2019-01-01 PROCEDURE — 80053 COMPREHEN METABOLIC PANEL: CPT

## 2019-01-01 PROCEDURE — 92950 HEART/LUNG RESUSCITATION CPR: CPT

## 2019-01-01 PROCEDURE — 82570 ASSAY OF URINE CREATININE: CPT

## 2019-01-01 PROCEDURE — 96376 TX/PRO/DX INJ SAME DRUG ADON: CPT

## 2019-01-01 PROCEDURE — 82550 ASSAY OF CK (CPK): CPT

## 2019-01-01 PROCEDURE — 84300 ASSAY OF URINE SODIUM: CPT

## 2019-01-01 PROCEDURE — 2580000003 HC RX 258: Performed by: INTERNAL MEDICINE

## 2019-01-01 PROCEDURE — 96367 TX/PROPH/DG ADDL SEQ IV INF: CPT

## 2019-01-01 PROCEDURE — 4500000030 HC CRITICAL CARE PROCEDURE

## 2019-01-01 PROCEDURE — 94761 N-INVAS EAR/PLS OXIMETRY MLT: CPT

## 2019-01-01 PROCEDURE — 93010 ELECTROCARDIOGRAM REPORT: CPT | Performed by: INTERNAL MEDICINE

## 2019-01-01 PROCEDURE — 82962 GLUCOSE BLOOD TEST: CPT

## 2019-01-01 PROCEDURE — 82330 ASSAY OF CALCIUM: CPT

## 2019-01-01 PROCEDURE — 84133 ASSAY OF URINE POTASSIUM: CPT

## 2019-01-01 PROCEDURE — 83735 ASSAY OF MAGNESIUM: CPT

## 2019-01-01 PROCEDURE — 6370000000 HC RX 637 (ALT 250 FOR IP): Performed by: PHYSICIAN ASSISTANT

## 2019-01-01 PROCEDURE — 6360000002 HC RX W HCPCS: Performed by: EMERGENCY MEDICINE

## 2019-01-01 PROCEDURE — 83970 ASSAY OF PARATHORMONE: CPT

## 2019-01-01 PROCEDURE — 85025 COMPLETE CBC W/AUTO DIFF WBC: CPT

## 2019-01-01 PROCEDURE — 82310 ASSAY OF CALCIUM: CPT

## 2019-01-01 PROCEDURE — 2580000003 HC RX 258: Performed by: PHYSICIAN ASSISTANT

## 2019-01-01 PROCEDURE — 2500000003 HC RX 250 WO HCPCS: Performed by: INTERNAL MEDICINE

## 2019-01-01 PROCEDURE — 2500000003 HC RX 250 WO HCPCS: Performed by: NURSE PRACTITIONER

## 2019-01-01 PROCEDURE — 82436 ASSAY OF URINE CHLORIDE: CPT

## 2019-01-01 PROCEDURE — 84484 ASSAY OF TROPONIN QUANT: CPT

## 2019-01-01 PROCEDURE — 2580000003 HC RX 258

## 2019-01-01 PROCEDURE — 97167 OT EVAL HIGH COMPLEX 60 MIN: CPT

## 2019-01-01 PROCEDURE — 96365 THER/PROPH/DIAG IV INF INIT: CPT

## 2019-01-01 PROCEDURE — 02HV33Z INSERTION OF INFUSION DEVICE INTO SUPERIOR VENA CAVA, PERCUTANEOUS APPROACH: ICD-10-PCS | Performed by: HOSPITALIST

## 2019-01-01 PROCEDURE — 36569 INSJ PICC 5 YR+ W/O IMAGING: CPT

## 2019-01-01 PROCEDURE — 84156 ASSAY OF PROTEIN URINE: CPT

## 2019-01-01 PROCEDURE — C1751 CATH, INF, PER/CENT/MIDLINE: HCPCS

## 2019-01-01 RX ORDER — 0.9 % SODIUM CHLORIDE 0.9 %
1000 INTRAVENOUS SOLUTION INTRAVENOUS ONCE
Status: DISCONTINUED | OUTPATIENT
Start: 2019-01-01 | End: 2019-01-01

## 2019-01-01 RX ORDER — SODIUM CHLORIDE 9 MG/ML
INJECTION, SOLUTION INTRAVENOUS CONTINUOUS
Status: DISCONTINUED | OUTPATIENT
Start: 2019-01-01 | End: 2019-01-01

## 2019-01-01 RX ORDER — MAGNESIUM SULFATE IN WATER 40 MG/ML
2 INJECTION, SOLUTION INTRAVENOUS ONCE
Status: COMPLETED | OUTPATIENT
Start: 2019-01-01 | End: 2019-01-01

## 2019-01-01 RX ORDER — SODIUM CHLORIDE 0.9 % (FLUSH) 0.9 %
10 SYRINGE (ML) INJECTION EVERY 12 HOURS SCHEDULED
Status: DISCONTINUED | OUTPATIENT
Start: 2019-01-01 | End: 2019-01-01 | Stop reason: HOSPADM

## 2019-01-01 RX ORDER — CALCITRIOL 1 UG/ML
1 SOLUTION ORAL 2 TIMES DAILY
Status: COMPLETED | OUTPATIENT
Start: 2019-01-01 | End: 2019-01-01

## 2019-01-01 RX ORDER — CALCIUM CARBONATE 200(500)MG
1000 TABLET,CHEWABLE ORAL 2 TIMES DAILY
Status: DISCONTINUED | OUTPATIENT
Start: 2019-01-01 | End: 2019-01-01

## 2019-01-01 RX ORDER — LIDOCAINE HYDROCHLORIDE 10 MG/ML
5 INJECTION, SOLUTION EPIDURAL; INFILTRATION; INTRACAUDAL; PERINEURAL ONCE
Status: COMPLETED | OUTPATIENT
Start: 2019-01-01 | End: 2019-01-01

## 2019-01-01 RX ORDER — POTASSIUM CHLORIDE 1.5 G/1.77G
40 POWDER, FOR SOLUTION ORAL EVERY 4 HOURS
Status: DISPENSED | OUTPATIENT
Start: 2019-01-01 | End: 2019-01-01

## 2019-01-01 RX ORDER — AMLODIPINE BESYLATE 5 MG/1
5 TABLET ORAL ONCE
Status: COMPLETED | OUTPATIENT
Start: 2019-01-01 | End: 2019-01-01

## 2019-01-01 RX ORDER — SODIUM CHLORIDE 0.9 % (FLUSH) 0.9 %
10 SYRINGE (ML) INJECTION PRN
Status: DISCONTINUED | OUTPATIENT
Start: 2019-01-01 | End: 2019-01-01 | Stop reason: HOSPADM

## 2019-01-01 RX ORDER — SPIRONOLACTONE 50 MG/1
50 TABLET, FILM COATED ORAL DAILY
Qty: 30 TABLET | Refills: 3 | Status: SHIPPED | OUTPATIENT
Start: 2019-01-01

## 2019-01-01 RX ORDER — CALCIUM CARBONATE 200(500)MG
1000 TABLET,CHEWABLE ORAL EVERY 4 HOURS
Status: DISCONTINUED | OUTPATIENT
Start: 2019-01-01 | End: 2019-01-01

## 2019-01-01 RX ORDER — ATORVASTATIN CALCIUM 40 MG/1
40 TABLET, FILM COATED ORAL DAILY
Status: DISCONTINUED | OUTPATIENT
Start: 2019-01-01 | End: 2019-01-01 | Stop reason: HOSPADM

## 2019-01-01 RX ORDER — SPIRONOLACTONE 50 MG/1
50 TABLET, FILM COATED ORAL DAILY
Status: DISCONTINUED | OUTPATIENT
Start: 2019-01-01 | End: 2019-01-01 | Stop reason: HOSPADM

## 2019-01-01 RX ORDER — SPIRONOLACTONE 25 MG/1
25 TABLET ORAL DAILY
Status: DISCONTINUED | OUTPATIENT
Start: 2019-01-01 | End: 2019-01-01

## 2019-01-01 RX ORDER — HYDRALAZINE HYDROCHLORIDE 20 MG/ML
10 INJECTION INTRAMUSCULAR; INTRAVENOUS EVERY 6 HOURS PRN
Status: DISCONTINUED | OUTPATIENT
Start: 2019-01-01 | End: 2019-01-01

## 2019-01-01 RX ORDER — CALCIUM CHLORIDE 100 MG/ML
INJECTION INTRAVENOUS; INTRAVENTRICULAR DAILY PRN
Status: COMPLETED | OUTPATIENT
Start: 2019-01-01 | End: 2019-01-01

## 2019-01-01 RX ORDER — ERGOCALCIFEROL 1.25 MG/1
50000 CAPSULE ORAL WEEKLY
Status: DISCONTINUED | OUTPATIENT
Start: 2019-01-01 | End: 2019-01-01 | Stop reason: HOSPADM

## 2019-01-01 RX ORDER — POTASSIUM CHLORIDE 7.45 MG/ML
10 INJECTION INTRAVENOUS PRN
Status: DISCONTINUED | OUTPATIENT
Start: 2019-01-01 | End: 2019-01-01 | Stop reason: HOSPADM

## 2019-01-01 RX ORDER — DIAZEPAM 5 MG/1
10 TABLET ORAL ONCE
Status: COMPLETED | OUTPATIENT
Start: 2019-01-01 | End: 2019-01-01

## 2019-01-01 RX ORDER — POTASSIUM CHLORIDE AND SODIUM CHLORIDE 900; 300 MG/100ML; MG/100ML
INJECTION, SOLUTION INTRAVENOUS CONTINUOUS
Status: DISPENSED | OUTPATIENT
Start: 2019-01-01 | End: 2019-01-01

## 2019-01-01 RX ORDER — AMLODIPINE BESYLATE 5 MG/1
5 TABLET ORAL DAILY
Status: DISCONTINUED | OUTPATIENT
Start: 2019-01-01 | End: 2019-01-01 | Stop reason: HOSPADM

## 2019-01-01 RX ORDER — POTASSIUM CHLORIDE 20 MEQ/1
40 TABLET, EXTENDED RELEASE ORAL ONCE
Status: COMPLETED | OUTPATIENT
Start: 2019-01-01 | End: 2019-01-01

## 2019-01-01 RX ORDER — ONDANSETRON 2 MG/ML
4 INJECTION INTRAMUSCULAR; INTRAVENOUS EVERY 6 HOURS PRN
Status: DISCONTINUED | OUTPATIENT
Start: 2019-01-01 | End: 2019-01-01 | Stop reason: HOSPADM

## 2019-01-01 RX ORDER — MAGNESIUM SULFATE 1 G/100ML
1 INJECTION INTRAVENOUS PRN
Status: DISCONTINUED | OUTPATIENT
Start: 2019-01-01 | End: 2019-01-01 | Stop reason: HOSPADM

## 2019-01-01 RX ORDER — DEXTROSE MONOHYDRATE 25 G/50ML
INJECTION, SOLUTION INTRAVENOUS DAILY PRN
Status: COMPLETED | OUTPATIENT
Start: 2019-01-01 | End: 2019-01-01

## 2019-01-01 RX ORDER — VENLAFAXINE HYDROCHLORIDE 150 MG/1
150 CAPSULE, EXTENDED RELEASE ORAL DAILY
Status: DISCONTINUED | OUTPATIENT
Start: 2019-01-01 | End: 2019-01-01 | Stop reason: HOSPADM

## 2019-01-01 RX ORDER — CALCIUM CARBONATE 200(500)MG
1000 TABLET,CHEWABLE ORAL EVERY 4 HOURS
Qty: 360 TABLET | Refills: 0 | Status: SHIPPED | OUTPATIENT
Start: 2019-01-01 | End: 2019-01-01 | Stop reason: HOSPADM

## 2019-01-01 RX ORDER — POTASSIUM CHLORIDE 1.5 G/1.77G
40 POWDER, FOR SOLUTION ORAL PRN
Status: DISCONTINUED | OUTPATIENT
Start: 2019-01-01 | End: 2019-01-01 | Stop reason: HOSPADM

## 2019-01-01 RX ADMIN — SPIRONOLACTONE 25 MG: 25 TABLET ORAL at 10:28

## 2019-01-01 RX ADMIN — POTASSIUM CHLORIDE 10 MEQ: 10 INJECTION, SOLUTION INTRAVENOUS at 04:23

## 2019-01-01 RX ADMIN — SPIRONOLACTONE 25 MG: 25 TABLET ORAL at 21:15

## 2019-01-01 RX ADMIN — MAGNESIUM OXIDE TAB 400 MG (241.3 MG ELEMENTAL MG) 400 MG: 400 (241.3 MG) TAB at 21:15

## 2019-01-01 RX ADMIN — POTASSIUM CHLORIDE AND SODIUM CHLORIDE: 900; 300 INJECTION, SOLUTION INTRAVENOUS at 22:18

## 2019-01-01 RX ADMIN — POTASSIUM CHLORIDE 10 MEQ: 10 INJECTION, SOLUTION INTRAVENOUS at 15:46

## 2019-01-01 RX ADMIN — POTASSIUM CHLORIDE 40 MEQ: 1.5 POWDER, FOR SOLUTION ORAL at 10:27

## 2019-01-01 RX ADMIN — VENLAFAXINE HYDROCHLORIDE 150 MG: 150 CAPSULE, EXTENDED RELEASE ORAL at 09:45

## 2019-01-01 RX ADMIN — CALCIUM CARBONATE 1000 MG: 500 TABLET, CHEWABLE ORAL at 03:31

## 2019-01-01 RX ADMIN — LIDOCAINE HYDROCHLORIDE 5 ML: 10 INJECTION, SOLUTION EPIDURAL; INFILTRATION; INTRACAUDAL; PERINEURAL at 09:07

## 2019-01-01 RX ADMIN — POTASSIUM CHLORIDE AND SODIUM CHLORIDE: 900; 300 INJECTION, SOLUTION INTRAVENOUS at 16:00

## 2019-01-01 RX ADMIN — SODIUM CHLORIDE, PRESERVATIVE FREE 10 ML: 5 INJECTION INTRAVENOUS at 20:04

## 2019-01-01 RX ADMIN — POTASSIUM & SODIUM PHOSPHATES POWDER PACK 280-160-250 MG 250 MG: 280-160-250 PACK at 20:44

## 2019-01-01 RX ADMIN — SPIRONOLACTONE 50 MG: 50 TABLET ORAL at 08:40

## 2019-01-01 RX ADMIN — CALCIUM GLUCONATE 2 G: 98 INJECTION, SOLUTION INTRAVENOUS at 13:31

## 2019-01-01 RX ADMIN — SODIUM CHLORIDE, PRESERVATIVE FREE 10 ML: 5 INJECTION INTRAVENOUS at 21:24

## 2019-01-01 RX ADMIN — MAGNESIUM SULFATE HEPTAHYDRATE 1 G: 1 INJECTION, SOLUTION INTRAVENOUS at 02:09

## 2019-01-01 RX ADMIN — ONDANSETRON 4 MG: 2 INJECTION INTRAMUSCULAR; INTRAVENOUS at 08:40

## 2019-01-01 RX ADMIN — CALCIUM CARBONATE 1000 MG: 500 TABLET, CHEWABLE ORAL at 00:30

## 2019-01-01 RX ADMIN — SODIUM CHLORIDE, PRESERVATIVE FREE 10 ML: 5 INJECTION INTRAVENOUS at 10:11

## 2019-01-01 RX ADMIN — ATORVASTATIN CALCIUM 40 MG: 40 TABLET, FILM COATED ORAL at 20:03

## 2019-01-01 RX ADMIN — POTASSIUM & SODIUM PHOSPHATES POWDER PACK 280-160-250 MG 250 MG: 280-160-250 PACK at 16:06

## 2019-01-01 RX ADMIN — AMLODIPINE BESYLATE 5 MG: 5 TABLET ORAL at 10:49

## 2019-01-01 RX ADMIN — CALCIUM CARBONATE 1000 MG: 500 TABLET, CHEWABLE ORAL at 23:39

## 2019-01-01 RX ADMIN — DIAZEPAM 10 MG: 5 TABLET ORAL at 12:46

## 2019-01-01 RX ADMIN — CALCITRIOL 1 MCG: 1 SOLUTION ORAL at 20:10

## 2019-01-01 RX ADMIN — SODIUM CHLORIDE, PRESERVATIVE FREE 10 ML: 5 INJECTION INTRAVENOUS at 09:46

## 2019-01-01 RX ADMIN — CALCIUM CARBONATE 1000 MG: 500 TABLET, CHEWABLE ORAL at 03:38

## 2019-01-01 RX ADMIN — ENOXAPARIN SODIUM 40 MG: 100 INJECTION SUBCUTANEOUS at 10:49

## 2019-01-01 RX ADMIN — CALCIUM CARBONATE 1000 MG: 500 TABLET, CHEWABLE ORAL at 20:10

## 2019-01-01 RX ADMIN — AMLODIPINE BESYLATE 5 MG: 5 TABLET ORAL at 17:12

## 2019-01-01 RX ADMIN — POTASSIUM & SODIUM PHOSPHATES POWDER PACK 280-160-250 MG 250 MG: 280-160-250 PACK at 14:10

## 2019-01-01 RX ADMIN — CALCIUM CARBONATE 1000 MG: 500 TABLET, CHEWABLE ORAL at 14:00

## 2019-01-01 RX ADMIN — Medication 50 MEQ: at 18:35

## 2019-01-01 RX ADMIN — ATORVASTATIN CALCIUM 40 MG: 40 TABLET, FILM COATED ORAL at 20:37

## 2019-01-01 RX ADMIN — EPINEPHRINE 1 MG: 0.1 INJECTION INTRACARDIAC; INTRAVENOUS at 18:35

## 2019-01-01 RX ADMIN — VENLAFAXINE HYDROCHLORIDE 150 MG: 150 CAPSULE, EXTENDED RELEASE ORAL at 10:28

## 2019-01-01 RX ADMIN — POTASSIUM CHLORIDE AND SODIUM CHLORIDE: 900; 300 INJECTION, SOLUTION INTRAVENOUS at 02:01

## 2019-01-01 RX ADMIN — POTASSIUM CHLORIDE 20 MEQ: 20 TABLET, EXTENDED RELEASE ORAL at 12:56

## 2019-01-01 RX ADMIN — Medication 50 MEQ: at 18:38

## 2019-01-01 RX ADMIN — MAGNESIUM SULFATE HEPTAHYDRATE 1 G: 1 INJECTION, SOLUTION INTRAVENOUS at 22:41

## 2019-01-01 RX ADMIN — MAGNESIUM OXIDE TAB 400 MG (241.3 MG ELEMENTAL MG) 400 MG: 400 (241.3 MG) TAB at 10:28

## 2019-01-01 RX ADMIN — VENLAFAXINE HYDROCHLORIDE 150 MG: 150 CAPSULE, EXTENDED RELEASE ORAL at 10:49

## 2019-01-01 RX ADMIN — POTASSIUM & SODIUM PHOSPHATES POWDER PACK 280-160-250 MG 250 MG: 280-160-250 PACK at 09:46

## 2019-01-01 RX ADMIN — EPINEPHRINE 1 MG: 0.1 INJECTION INTRACARDIAC; INTRAVENOUS at 18:38

## 2019-01-01 RX ADMIN — ATORVASTATIN CALCIUM 40 MG: 40 TABLET, FILM COATED ORAL at 20:10

## 2019-01-01 RX ADMIN — CALCIUM GLUCONATE 2 G: 98 INJECTION, SOLUTION INTRAVENOUS at 21:12

## 2019-01-01 RX ADMIN — AMLODIPINE BESYLATE 5 MG: 5 TABLET ORAL at 10:28

## 2019-01-01 RX ADMIN — AMLODIPINE BESYLATE 5 MG: 5 TABLET ORAL at 10:10

## 2019-01-01 RX ADMIN — SODIUM CHLORIDE, PRESERVATIVE FREE 10 ML: 5 INJECTION INTRAVENOUS at 10:36

## 2019-01-01 RX ADMIN — VENLAFAXINE HYDROCHLORIDE 150 MG: 150 CAPSULE, EXTENDED RELEASE ORAL at 10:10

## 2019-01-01 RX ADMIN — CALCIUM CARBONATE 1000 MG: 500 TABLET, CHEWABLE ORAL at 16:05

## 2019-01-01 RX ADMIN — POTASSIUM CHLORIDE 10 MEQ: 10 INJECTION, SOLUTION INTRAVENOUS at 22:41

## 2019-01-01 RX ADMIN — POTASSIUM & SODIUM PHOSPHATES POWDER PACK 280-160-250 MG 250 MG: 280-160-250 PACK at 16:12

## 2019-01-01 RX ADMIN — SODIUM PHOSPHATE, MONOBASIC, MONOHYDRATE 26.85 MMOL: 276; 142 INJECTION, SOLUTION INTRAVENOUS at 17:12

## 2019-01-01 RX ADMIN — CALCIUM CARBONATE 1000 MG: 500 TABLET, CHEWABLE ORAL at 20:45

## 2019-01-01 RX ADMIN — SODIUM CHLORIDE, PRESERVATIVE FREE 10 ML: 5 INJECTION INTRAVENOUS at 08:41

## 2019-01-01 RX ADMIN — POTASSIUM CHLORIDE 10 MEQ: 10 INJECTION, SOLUTION INTRAVENOUS at 01:06

## 2019-01-01 RX ADMIN — POTASSIUM CHLORIDE 10 MEQ: 10 INJECTION, SOLUTION INTRAVENOUS at 16:30

## 2019-01-01 RX ADMIN — POTASSIUM CHLORIDE 10 MEQ: 10 INJECTION, SOLUTION INTRAVENOUS at 13:39

## 2019-01-01 RX ADMIN — SPIRONOLACTONE 25 MG: 25 TABLET ORAL at 10:46

## 2019-01-01 RX ADMIN — SODIUM CHLORIDE: 9 INJECTION, SOLUTION INTRAVENOUS at 17:12

## 2019-01-01 RX ADMIN — VENLAFAXINE HYDROCHLORIDE 150 MG: 150 CAPSULE, EXTENDED RELEASE ORAL at 17:12

## 2019-01-01 RX ADMIN — MAGNESIUM SULFATE HEPTAHYDRATE 1 G: 1 INJECTION, SOLUTION INTRAVENOUS at 01:06

## 2019-01-01 RX ADMIN — POTASSIUM CHLORIDE 10 MEQ: 10 INJECTION, SOLUTION INTRAVENOUS at 02:09

## 2019-01-01 RX ADMIN — CALCIUM CARBONATE 1000 MG: 500 TABLET, CHEWABLE ORAL at 18:20

## 2019-01-01 RX ADMIN — POTASSIUM CHLORIDE 10 MEQ: 10 INJECTION, SOLUTION INTRAVENOUS at 03:09

## 2019-01-01 RX ADMIN — CALCIUM CARBONATE 1000 MG: 500 TABLET, CHEWABLE ORAL at 10:10

## 2019-01-01 RX ADMIN — AMLODIPINE BESYLATE 5 MG: 5 TABLET ORAL at 09:46

## 2019-01-01 RX ADMIN — CALCIUM CARBONATE 1000 MG: 500 TABLET, CHEWABLE ORAL at 20:37

## 2019-01-01 RX ADMIN — SODIUM PHOSPHATE, MONOBASIC, MONOHYDRATE 13.41 MMOL: 276; 142 INJECTION, SOLUTION INTRAVENOUS at 15:27

## 2019-01-01 RX ADMIN — CALCIUM CARBONATE 1000 MG: 500 TABLET, CHEWABLE ORAL at 10:28

## 2019-01-01 RX ADMIN — ENOXAPARIN SODIUM 40 MG: 100 INJECTION SUBCUTANEOUS at 10:28

## 2019-01-01 RX ADMIN — VENLAFAXINE HYDROCHLORIDE 150 MG: 150 CAPSULE, EXTENDED RELEASE ORAL at 08:40

## 2019-01-01 RX ADMIN — ATORVASTATIN CALCIUM 40 MG: 40 TABLET, FILM COATED ORAL at 20:44

## 2019-01-01 RX ADMIN — CALCIUM CHLORIDE 1 G: 100 INJECTION, SOLUTION INTRAVENOUS; INTRAVENTRICULAR at 18:36

## 2019-01-01 RX ADMIN — CALCIUM CARBONATE 1000 MG: 500 TABLET, CHEWABLE ORAL at 09:45

## 2019-01-01 RX ADMIN — ATORVASTATIN CALCIUM 40 MG: 40 TABLET, FILM COATED ORAL at 21:23

## 2019-01-01 RX ADMIN — CALCIUM CARBONATE 1000 MG: 500 TABLET, CHEWABLE ORAL at 06:05

## 2019-01-01 RX ADMIN — POTASSIUM CHLORIDE 10 MEQ: 10 INJECTION, SOLUTION INTRAVENOUS at 15:27

## 2019-01-01 RX ADMIN — CALCIUM CARBONATE 1000 MG: 500 TABLET, CHEWABLE ORAL at 16:12

## 2019-01-01 RX ADMIN — ENOXAPARIN SODIUM 40 MG: 100 INJECTION SUBCUTANEOUS at 17:12

## 2019-01-01 RX ADMIN — SPIRONOLACTONE 25 MG: 25 TABLET ORAL at 09:46

## 2019-01-01 RX ADMIN — EPINEPHRINE 1 MG: 0.1 INJECTION INTRACARDIAC; INTRAVENOUS at 18:41

## 2019-01-01 RX ADMIN — POTASSIUM CHLORIDE AND SODIUM CHLORIDE: 900; 300 INJECTION, SOLUTION INTRAVENOUS at 21:25

## 2019-01-01 RX ADMIN — SODIUM CHLORIDE, PRESERVATIVE FREE 10 ML: 5 INJECTION INTRAVENOUS at 10:35

## 2019-01-01 RX ADMIN — HYDRALAZINE HYDROCHLORIDE 10 MG: 20 INJECTION INTRAMUSCULAR; INTRAVENOUS at 06:39

## 2019-01-01 RX ADMIN — HYDRALAZINE HYDROCHLORIDE 10 MG: 20 INJECTION INTRAMUSCULAR; INTRAVENOUS at 12:22

## 2019-01-01 RX ADMIN — CALCIUM CARBONATE 1000 MG: 500 TABLET, CHEWABLE ORAL at 21:24

## 2019-01-01 RX ADMIN — CALCIUM CARBONATE 1000 MG: 500 TABLET, CHEWABLE ORAL at 16:51

## 2019-01-01 RX ADMIN — POTASSIUM CHLORIDE 40 MEQ: 1.5 POWDER, FOR SOLUTION ORAL at 10:49

## 2019-01-01 RX ADMIN — ERGOCALCIFEROL 50000 UNITS: 1.25 CAPSULE ORAL at 20:37

## 2019-01-01 RX ADMIN — ENOXAPARIN SODIUM 40 MG: 100 INJECTION SUBCUTANEOUS at 09:46

## 2019-01-01 RX ADMIN — SODIUM CHLORIDE, PRESERVATIVE FREE 10 ML: 5 INJECTION INTRAVENOUS at 23:01

## 2019-01-01 RX ADMIN — CALCIUM CARBONATE 1000 MG: 500 TABLET, CHEWABLE ORAL at 10:46

## 2019-01-01 RX ADMIN — MAGNESIUM SULFATE HEPTAHYDRATE 1 G: 1 INJECTION, SOLUTION INTRAVENOUS at 00:07

## 2019-01-01 RX ADMIN — ENOXAPARIN SODIUM 40 MG: 100 INJECTION SUBCUTANEOUS at 08:40

## 2019-01-01 RX ADMIN — CALCIUM CARBONATE 1000 MG: 500 TABLET, CHEWABLE ORAL at 03:08

## 2019-01-01 RX ADMIN — AMLODIPINE BESYLATE 5 MG: 5 TABLET ORAL at 08:40

## 2019-01-01 RX ADMIN — CALCIUM CARBONATE 1000 MG: 500 TABLET, CHEWABLE ORAL at 12:18

## 2019-01-01 RX ADMIN — POTASSIUM CHLORIDE 10 MEQ: 10 INJECTION, SOLUTION INTRAVENOUS at 14:12

## 2019-01-01 RX ADMIN — POTASSIUM CHLORIDE 10 MEQ: 10 INJECTION, SOLUTION INTRAVENOUS at 12:56

## 2019-01-01 RX ADMIN — MAGNESIUM SULFATE HEPTAHYDRATE 2 G: 40 INJECTION, SOLUTION INTRAVENOUS at 14:48

## 2019-01-01 RX ADMIN — ENOXAPARIN SODIUM 40 MG: 100 INJECTION SUBCUTANEOUS at 10:10

## 2019-01-01 RX ADMIN — DEXTROSE MONOHYDRATE 25 G: 25 INJECTION, SOLUTION INTRAVENOUS at 18:41

## 2019-01-01 RX ADMIN — CALCITRIOL 1 MCG: 1 SOLUTION ORAL at 10:27

## 2019-01-01 RX ADMIN — POTASSIUM CHLORIDE 10 MEQ: 10 INJECTION, SOLUTION INTRAVENOUS at 00:06

## 2019-01-01 RX ADMIN — POTASSIUM CHLORIDE 10 MEQ: 10 INJECTION, SOLUTION INTRAVENOUS at 14:30

## 2019-01-01 RX ADMIN — CALCIUM CARBONATE 1000 MG: 500 TABLET, CHEWABLE ORAL at 03:32

## 2019-01-01 RX ADMIN — AMLODIPINE BESYLATE 5 MG: 5 TABLET ORAL at 15:41

## 2019-01-01 RX ADMIN — POTASSIUM PHOSPHATE, MONOBASIC AND POTASSIUM PHOSPHATE, DIBASIC 10 MMOL: 224; 236 INJECTION, SOLUTION, CONCENTRATE INTRAVENOUS at 20:03

## 2019-01-01 RX ADMIN — POTASSIUM PHOSPHATE, MONOBASIC AND POTASSIUM PHOSPHATE, DIBASIC 20 MMOL: 224; 236 INJECTION, SOLUTION, CONCENTRATE INTRAVENOUS at 11:42

## 2019-01-01 RX ADMIN — SPIRONOLACTONE 25 MG: 25 TABLET ORAL at 10:10

## 2019-01-01 ASSESSMENT — PAIN SCALES - GENERAL
PAINLEVEL_OUTOF10: 0

## 2019-05-08 PROBLEM — E83.51 HYPOCALCEMIA: Status: ACTIVE | Noted: 2019-01-01

## 2019-05-08 NOTE — ED TRIAGE NOTES
Pt presents to ER via EMS for fall and new onset of muscle contractures/rigidity. Pt states she remembers turning the TV off last night to go to bed and woke up on the floor this morning. Pt called EMS for help up from the floor when she realized she is unable to bend her legs to stand. Pt states she is normally able to walk and cares for herself at home. Pt arms are bent, fists clenched and legs are rigid with toes pointed. Pt is alert and oriented and reports no pain.

## 2019-05-08 NOTE — CONSULTS
Nephrology Service Consultation        [address], [address] [place] [address]  Phone: [phone]  Office Hours: 8:30AM - 4:30PM  Monday - Friday           Patient:  Bridget Forbes  MRN: [id number]  Consulting physician:  Wenceslao Luis MD  Reason for Consult: electrolyte derangements: low potassium, low calcium, low phos    PCP: No primary care provider on file. HISTORY OF PRESENT ILLNESS:   The patient is a 46 y.o. female with cerebral palsy presented after falling at home  She had difficulties getting up from the floor  She was found to be hypokalemia ar 3.4, hypocalcemic at 6.7, hypophosphatemic at 1.1, hypomagnesemic at 0.7  She denies changes in her meds, diarrhea, vomiting. She has never had these issues before    REVIEW OF SYSTEMS:  14 point ROS is Negative. 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  Authorizing Provider   venlafaxine (EFFEXOR XR) 150 MG extended release capsule Take 1 capsule by mouth daily 7/5/18  Yes Beata Betancourt MD   metFORMIN (GLUCOPHAGE) 500 MG tablet Take 1 tablet by mouth 2 times daily (with meals) 7/5/18  Yes Beata Betancoutr MD   loratadine (CLARITIN) 10 MG tablet Take 1 tablet by mouth daily 11/19/18   Beata Betancourt MD   chlorthalidone (HYGROTON) 25 MG tablet Take 0.5 tablets by mouth daily 7/5/18   Beata Betancourt MD   levonorgestrel-ethinyl estradiol Amaya [place]) 0.1-20 MG-MCG per tablet Take 1 tablet by mouth daily 7/5/18   Beata Betancourt MD   amLODIPine (NORVASC) 5 MG tablet Take 1 tablet by mouth daily 7/5/18   Nikki Morrison MD   Alcohol Swabs 70 % PADS Use twice per day 7/5/18   Nikki Morrison MD   Lancets MISC Use twice per day 7/5/18   Nikki Morrison MD   atorvastatin (LIPITOR) 40 MG tablet Take 1 tablet by mouth daily 3/20/18   Nikki Morrison MD   Glucose Blood (BLOOD GLUCOSE TEST STRIPS) STRP Use twice per day 3/20/18   Nikki Morrison MD   potassium chloride (KLOR-CON M) 20 MEQ extended release tablet Take 1 tablet by mouth daily 3/20/18   Nikki Morrison MD   glucose monitoring kit (FREESTYLE) monitoring kit 1 kit by Does not apply route daily as needed. Needs talking glucometer 2/14/14   Nikki Morrison MD        Allergies:  Patient has no known allergies. Social History:   TOBACCO:   reports that she has never smoked. She has never used smokeless tobacco.  ETOH:   reports that she does not drink alcohol.   OCCUPATION:      Family History:       Problem Relation Age of Onset    Other Mother         sarcoid    High Blood Pressure Father     Other Brother         Mentally disabled           Physical Exam:    Vitals: BP (!) 147/85   Pulse 99   Temp 98.7 °F (37.1 °C) (Oral)   Resp 16   Ht 5' 3\" (1.6 m)   Wt 174 lb 6.4 oz (79.1 kg)   SpO2 96%   BMI 30.89 kg/m²   General appearance: in no acute distress, appears stated age  Skin: Skin color, texture, turgor normal. No rashes or lesions  HEENT: normocephalic, atraumatic  Neck: supple, trachea midline  Lungs: clear to auscultation bilaterally, breathing comfortably  Heart[de-identified] regular rate and rhythm, S1, S2 normal,  Abdomen: soft, non-tender; bowel sounds normal; no masses,   Extremities: extremities normal, atraumatic, no cyanosis or edema  Neurologic: Mental status: alert, oriented, interactive, following commands  Psychiatric: mood and affect appropriate    CBC:   Recent Labs     05/08/19  1116   WBC 13.0*   HGB 14.4   *     BMP:    Recent Labs     05/08/19  1116      K 2.4  K & CA CALLED TO MABLE CORNELL ON 05/08/2019 @ 1217 BY Asa Li MLS   RESULTS READ BACK  *   CL 95*   CO2 23   BUN 6   CREATININE 0.6   GLUCOSE 148*     Hepatic:   Recent Labs     05/08/19  1116   AST 38*   ALT 24   BILITOT 0.3   ALKPHOS 78     Troponin: No results for input(s): TROPONINI in the last 72 hours. BNP: No results for input(s): BNP in the last 72 hours. Lipids: No results for input(s): CHOL, HDL in the last 72 hours. Invalid input(s): LDLCALCU  ABGs: No results found for: PHART, PO2ART, HQH4RJV  INR: No results for input(s): INR in the last 72 hours. No intake/output data recorded. No intake/output data recorded.      -----------------------------------------------------------------      Assessment and Recommendations   Rhabdomyolysis: ck in the 1600s  Hypokalemia: K 2.4  Hypomagnesemia: 0.7 on admission  Hypophosphatemia: 1.1  Hypocalcemia: 6.7  Vitamin d deficiency: 22 -oh vitamin d of 7.79  dm2  Cerebral palsy  htn  proteinuria    Plan:  - Stop ns and start ns+40meq kcl/liter  - Calculate up/c  - sliding scale replacement for potassium, magnesium ordered and to be used for repletion per levels drawn at 11pm  - check pth level  - start ergocalciferol  - obtain urine lytes  - start aldactone 25mg daily for now  - start oral mag  Will follow  Thank you        Electronically signed by Ginette Brantley DO on 5/8/2019 at 6:56 PM    [address],  [facility]  [address]  PHONE: [phone]  FAX: [phone]

## 2019-05-08 NOTE — ED NOTES
Patient return from radiology, repeat rainbow was drawn on patient including lactic acid and Ionized calcium     Edilia Zepeda  05/08/19 5766

## 2019-05-08 NOTE — ED PROVIDER NOTES
eMERGENCY dEPARTMENT eNCOUnter    Attending note    I cared for and evaluated the patient in conjunction with the ED Advanced Practice Provider    HPI/Physical Exam/Medical Decision Making  She Todd is a 46 y.o. female here via EMS for evaluation of muscle contractions and a possible fall. The patient states that last night around 11 PM she was getting ready for bed when she fell to the floor. She was unable to get up as her arms and legs felt weak in her muscles were contracted. The muscle can contractions continued. The patient denies pain. She denies being injured in the fall. Please see REY note for further details. Vitals:   ED Triage Vitals [05/08/19 1103]   Enc Vitals Group      /86      Pulse 108      Resp 15      Temp 98.4 °F (36.9 °C)      Temp Source Oral      SpO2 100 %      Weight       Height       Head Circumference       Peak Flow       Pain Score       Pain Loc       Pain Edu? Excl. in [address]? EKG Interpretation  Interpreted by me  No previous to compare to  Rhythm: normal sinus   Rate: normal 100  Axis: normal  Ectopy: none  Conduction: normal  ST Segments: normal  T Waves: inverted in leads 3, aVF, V3-V6  Q Waves: none  Clinical Impression: normal sinus rhythm, t-wave inversions in leads 3, aVF, V3-V6      Cardiac Monitor Strip Interpretation  Interpreted by me  Monitor strip interpreted for greater than 10 seconds  Rhythm: normal sinus   Rate: normal  Ectopy: none  ST Segments: normal      On exam, the patient is afebrile and nontoxic appearing. She is hemodynamically stable and neurologically intact. Airway is patent. Neck is supple. Heart sounds have a regular rate and rhythm. Lungs are clear to auscultation bilaterally. The patient's abdomen is soft, non-tender and non-distended with no peritoneal signs. Extremities are warm, pink, and well perfused. There are carpal pedal spasms noted.  Labs are obtained and are significant for leukocytosis, hypokalemia, hypocalcemia, hypo-magnesium anemia and an elevated anion gap. CK is 1688. CT head is negative for acute abnormality. There is chronic multifocal left cerebral atrophy and remote insults with encephalomalacia. There is cystic dilatation of the left lateral ventricle occipital horn related to the encephalomalacia. There are postsurgical changes of the right posterior parietal bone. CT cervical spine is negative for acute fracture or dislocation. It is multilevel degenerative disc disease with a 1-2 mm grade 1 anterolisthesis of C4 with respect to C5. There is multilevel right articular facet degenerative change resulting in degenerative neuroforaminal stenosis. Chest x-rays obtained and shows elevation of left hemidiaphragm with slight volume loss within the left hemithorax. There is no acute cardiopulmonary abnormality. EKG is obtained and shows a normal sinus rhythm with no ST elevation or depression. The patient was treated with Valium, IV and oral potassium chloride, IV calcium gluconate, sodium phosphate and magnesium. I suspect that her carpal pedal spasms are due to electrolyte derangements. She will be admitted to the hospital.      All diagnostic, treatment, and disposition decisions were made by myself in conjunction with the advanced practice provider. For all further details of the patient's emergency department visit, please see the advanced practice provider's documentation. Comment: Please note this report has been produced using speech recognition software and may contain errors related to that system including errors in grammar, punctuation, and spelling, as well as words and phrases that may be inappropriate. If there are any questions or concerns please feel free to contact the dictating provider for clarification.         Gabby Broderick MD  05/09/19 [id number]

## 2019-05-08 NOTE — H&P
HISTORY AND PHYSICAL  (Hospitalist, Internal Medicine)  IDENTIFYING INFORMATION   PATIENT:  Lum Lesches  MRN:  [id number]  ADMIT DATE: 5/8/2019  TIME OF EVALUATION: 5/8/2019 3:45 PM    CHIEF COMPLAINT     Falls and unable to get off the floor    HISTORY OF PRESENT ILLNESS   Lum Lesches is a 46 y.o. female presents with falls and unable to get of the floor. 49-year-old female with a history of cerebral palsy who lives alone went to bed last night and woke up on the floor. She is not sure how she ended up on the floor. She had difficulty getting all the floor for which she called emergency services. She denies a previous experience and denies any seizure-like activity. There was no loss of continence. On presentation to the ER patient noted to have what seems to be muscle contractures with tetany. Initial lab workup done showed a significantly abnormal electrolytes. Patient also had elevated CPK. She denies fever, chills, nausea or vomiting. She denies muscle cramps. 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    Obesity      Past Surgical History:   Procedure Laterality Date    EYE SURGERY      baby     Family History   Problem Relation Age of Onset    Other Mother         sarcoid    High Blood Pressure Father     Other Brother         Mentally disabled     Family Hx of HTN  Family Hx as reviewed above, otherwise non-contributory  Social History     Socioeconomic History    Marital status: Single     Spouse name: None    Number of children: None    Years of education: None    Highest education level: None   Occupational History    None   Social Needs    Financial resource strain: None    Food insecurity: Worry: None     Inability: None    Transportation needs:     Medical: None     Non-medical: None   Tobacco Use    Smoking status: Never Smoker    Smokeless tobacco: Never Used   Substance and Sexual Activity    Alcohol use: No    Drug use: No    Sexual activity: Never   Lifestyle    Physical activity:     Days per week: None     Minutes per session: None    Stress: None   Relationships    Social connections:     Talks on phone: None     Gets together: None     Attends Rastafari service: None     Active member of club or organization: None     Attends meetings of clubs or organizations: None     Relationship status: None    Intimate partner violence:     Fear of current or ex partner: None     Emotionally abused: None     Physically abused: None     Forced sexual activity: None   Other Topics Concern    None   Social History Narrative    None       MEDICATIONS   Medications Prior to Admission  Not in a hospital admission.     Current Medications  Current Facility-Administered Medications   Medication Dose Route Frequency Provider Last Rate Last Dose    potassium chloride 10 mEq/100 mL IVPB (Peripheral Line)  10 mEq Intravenous PRN ANETA Juarez 100 mL/hr at 05/08/19 1412 10 mEq at 05/08/19 1412    magnesium sulfate 2 g in 50 mL IVPB premix  2 g Intravenous Once Disha Pelaez MD 25 mL/hr at 05/08/19 1448 2 g at 05/08/19 1448    sodium phosphate 13.41 mmol in dextrose 5 % 250 mL IVPB  0.16 mmol/kg Intravenous PRN Disha Pelaez MD        Or   Romayne Hoffman sodium phosphate 26.85 mmol in dextrose 5 % 250 mL IVPB  0.32 mmol/kg Intravenous PRN Disha Pelaez MD         Current Outpatient Medications   Medication Sig Dispense Refill    loratadine (CLARITIN) 10 MG tablet Take 1 tablet by mouth daily 30 tablet 0    chlorthalidone (HYGROTON) 25 MG tablet Take 0.5 tablets by mouth daily 15 tablet 5    levonorgestrel-ethinyl estradiol (AVIANE) 0.1-20 MG-MCG per tablet Take 1 tablet by mouth daily 28 tablet 11    venlafaxine (EFFEXOR XR) 150 MG extended release capsule Take 1 capsule by mouth daily 30 capsule 5    amLODIPine (NORVASC) 5 MG tablet Take 1 tablet by mouth daily 30 tablet 5    metFORMIN (GLUCOPHAGE) 500 MG tablet Take 1 tablet by mouth 2 times daily (with meals) 60 tablet 5    Alcohol Swabs 70 % PADS Use twice per day 100 each 11    Lancets MISC Use twice per day 100 each 11    atorvastatin (LIPITOR) 40 MG tablet Take 1 tablet by mouth daily 30 tablet 11    Glucose Blood (BLOOD GLUCOSE TEST STRIPS) STRP Use twice per day 100 strip 11    potassium chloride (KLOR-CON M) 20 MEQ extended release tablet Take 1 tablet by mouth daily 30 tablet 11    glucose monitoring kit (FREESTYLE) monitoring kit 1 kit by Does not apply route daily as needed. Needs talking glucometer 1 kit 0         Allergies  No Known Allergies    REVIEW OF SYSTEMS   Within above limitations. 14 point review of systems reviewed. Pertinent positive or negative as per HPI or otherwise negative per 14 point systems review. PHYSICAL EXAM     Wt Readings from Last 3 Encounters:   05/08/19 185 lb (83.9 kg)   07/05/18 200 lb (90.7 kg)   03/20/18 208 lb 12.8 oz (94.7 kg)       Blood pressure 121/86, pulse 95, temperature 98.4 °F (36.9 °C), temperature source Oral, resp. rate 30, height 5' 3\" (1.6 m), weight 185 lb (83.9 kg), SpO2 98 %, not currently breastfeeding. General - AAO x 3  Psych - Appropriate affect/speech. No agitation  Eyes - patient with strabismus  ENT - Lips wnl. External ear clear/dry/intact. No thyromegaly on inspection  Neuro - No gross peripheral or central neuro deficits on inspection  Heart - Sinus. RRR. S1 and S2 present. No added HS/murmurs appreciated. No elevated JVD appreciated  Lung - Adequate air entry b/l, No crackles/wheezes appreciated  GI - Soft. No guarding/rigidity. No hepatosplenomegaly/ascites. BS+   - No CVA/suprapubic tenderness or palpable bladder distension  Skin - Intact.  No

## 2019-05-08 NOTE — ED PROVIDER NOTES
None   Other Topics Concern    None   Social History Narrative    None     Family History   Problem Relation Age of Onset    Other Mother         sarcoid    High Blood Pressure Father     Other Brother         Mentally disabled         PHYSICAL EXAM    VITAL SIGNS: /86   Pulse 108   Temp 98.4 °F (36.9 °C) (Oral)   Resp 15   SpO2 100%    Constitutional:  Well developed, Well nourished,   HENT:  Normocephalic, Atraumatic, PERRL. EOMI. Sclera clear. Conjunctiva normal, No discharge. Neck/Lymphatics: supple, no JVD, no swollen nodes  Cardiovascular:  Rate tachycardic, normal Rhythm,  no murmurs/rubs/gallops. No carotid bruits or murmurs heard in carotids. No JVD  Respiratory:  Nonlabored breathing. Normal breath sounds, No wheezing  Abdomen: Bowel sounds normal, Soft, No tenderness, no masses. Musculoskeletal:    There is no edema, asymmetry, or calf / thigh tenderness bilaterally. No cyanosis. No cool or pale-appearing limb. Distal cap refill and pulses intact bilateral upper and lower extremities  Bilateral upper and lower extremity ROM intact without pain or obvious deficit  Integument:  Warm, Dry  Neurologic: Alert & oriented , evidence of carpal/pedal contractions. Difficulty moving upper and lower extremity. No obvious clonus on lower extremity evaluation. Cranial nerves II through XII grossly intact. Normal gross motor coordination & motor strength bilateral upper and lower extremities  Sensation intact.   Psychiatric:  Affect normal, Mood normal.       Labs:  Results for orders placed or performed during the hospital encounter of 05/08/19   CBC auto diff   Result Value Ref Range    WBC 13.0 (H) 4.0 - 10.5 K/CU MM    RBC 5.49 (H) 4.2 - 5.4 M/CU MM    Hemoglobin 14.4 12.5 - 16.0 GM/DL    Hematocrit 45.9 37 - 47 %    MCV 83.6 78 - 100 FL    MCH 26.2 (L) 27 - 31 PG    MCHC 31.4 (L) 32.0 - 36.0 %    RDW 15.2 (H) 11.7 - 14.9 %    Platelets 708 (H) 770 - 440 K/CU MM    MPV 9.7 7.5 - 11.1 ischemia  Abnormal ECG  No previous ECGs available       EKG    See supervising physicians note for EKG interpretation    RADIOLOGY    Ct Head Wo Contrast    Result Date: 5/8/2019  EXAMINATION: CT OF THE HEAD WITHOUT CONTRAST,  5/8/2019 12:31 pm TECHNIQUE: CT of the head was performed without the administration of intravenous contrast. Dose modulation, iterative reconstruction, and/or weight based adjustment of the mA/kV was utilized to reduce the radiation dose to as low as reasonably achievable. COMPARISON: CT cervical spine without contrast 05/08/2019. HISTORY: ORDERING SYSTEM PROVIDED HISTORY: Syncope vs fall TECHNOLOGIST PROVIDED HISTORY: Has a \"code stroke\" or \"stroke alert\" been called? ->No Ordering Physician Provided Reason for Exam: Syncope vs fall Acuity: Acute Type of Exam: Initial Additional signs and symptoms: Pt thinks she may have passed out. Relevant Medical/Surgical History: Cerebral palsy, legally blind. FINDINGS: Skull/soft tissue:  Normal bone mineral density. Right posterior parietal calvarial attenuation with a celestina hole. The paranasal sinuses demonstrate mild patchy ethmoid mucoperiosteal thickening. No air-fluid levels. Bilateral mastoid air cells and external auditory canals are patent. The scalp and skull base soft tissues demonstrate no acute abnormality. The bilateral globes and lenses are intact. Intracranial contents:  No significant midline shift. The left cerebral hemisphere demonstrates atrophy and multifocal chronic encephalomalacia involving the frontal lobe, parietal lobe, occipital lobe, and temporal lobe. There is cystic dilatation of the left lateral ventricle posterior body and occipital horn. The right lateral ventricle is normal.  The 3rd and 4th ventricles are normal.  The basilar cisterns are patent. No acute intracranial hemorrhage. The cerebellum appears unremarkable. 1. No acute intracranial hemorrhage or global mass effect.  2. Chronic multifocal left exam:->syncope vs fall Ordering Physician Provided Reason for Exam: syncope vs fall Acuity: Acute Type of Exam: Initial Additional signs and symptoms: Pt presents to ER via EMS for fall and new onset of muscle contractures/rigidity. Pt states she remembers turning the TV off last night to go to bed and woke up on the floor this morning. Pt called EMS for help up from the floor when she realized she is unable to bend her legs to stand. Pt states she is normally able to walk and cares for herself at home. Pt arms are bent, fists clenched and legs are rigid with toes pointed. Pt is alert and oriented and reports no pain FINDINGS: There is elevation of the left hemidiaphragm. The cardiac silhouette appears within normal limits for size. No convincing focal consolidation. No pleural effusion or pneumothorax. 1. Elevation of the left hemidiaphragm with slight volume loss within the left hemithorax. 2. No acute cardiopulmonary abnormality. ED COURSE & MEDICAL DECISION MAKING     Patient presents as above. Emergent etiologies considered. Patient's initial vital signs are stable. She is presenting via EMS after sustaining a fall versus syncopal episode last evening and then has developed pedal/carpal muscle contractures to the upper and lower extremity. Has persisted into today, was able to call EMS to bring her to the emergency department. On evaluation she is alert oriented answering questions appropriately, tolerating secretions. There are obvious upper and lower extremity muscle contractions on examination. She denies any chest pain shortness of breath, heart palpitations. She admits that she is a diabetic, she is on a diuretic medication. She states she's never had history of these presenting symptoms. Workup was initiated demonstrate no significant EKG changes, no elevation of cardiac enzyme testing.   Patient has numerous electrolyte abnormalities, she has low potassium, low calcium, low magnesium

## 2019-05-09 NOTE — PROGRESS NOTES
Occupational Therapy    [facility] ACUTE CARE OCCUPATIONAL THERAPY EVALUATION    [address] [facility] [address], 1970, [id number], 5/9/2019    Discharge Recommendation: Steven Guzman      History:  [place]:  The primary encounter diagnosis was Muscle contracture, unspecified site. Diagnoses of Hypokalemia, Hypocalcemia, Hypomagnesemia, Hypophosphatemia, Non-traumatic rhabdomyolysis, and Fall, initial encounter were also pertinent to this visit. Subjective:  Patient states: \"I feel too weak to lift my head off of the pillow. \"  Pain: Pt denied pain this date  Communication with other providers: Discussed with RN DRAKE Harvey, Co-evaluation with PT Joanna  Restrictions: General Precautions, Fall Risk, IV, Portable Telemetry, Bed/chair alarm    Home Setup/Prior level of function:  Social/Functional History  Lives With: Alone  Type of Home: Apartment  Home Layout: One level  Home Access: Level entry  Bathroom Shower/Tub: Walk-in shower  Bathroom Toilet: Standard  Bathroom Equipment: Shower chair, Grab bars in [address] [place]: Cane, Alert Button  Receives Help From: (hired help M-F for ~3 hours for cleaning and grocery shopping)  ADL Assistance: Independent  Homemaking Assistance: Needs assistance (assistance for deep cleaning, pt will manage her cooking and laundry herself)  Ambulation Assistance: Independent (typically mod I with cane)  Transfer Assistance: Independent  Active : No  Patient's  Info: hired help   [address]: read (Kade)  Additional Comments: 1 fall recently, pt reports \"I got dizzy and just fell\"    Examination:  · Observation: Supine in bed upon arrival.  · Vision: Impaired (legally blind, reports being only able to see shadows)  · Hearing: [facility]  · Vitals:  Stable vitals throughout session    Body Systems and functions:  · ROM: Lt UE WFL all joints, Rt UE active shoulder flexion grossly 0-60' with compensation (secondary to CP), Rt UE grossly [place] distally  · Strength: Lt UE grossly 4+/5 MMT all major muscle groups, Rt UE grossly 3-/5 MMT shoulder flexors, 4/5 MMT elbow flexors/extensors, and grasp  · Sensation: WFL (denies numbness/tingling)  · Tone: Rt UE mildly hypertonic  · Coordination: Decreased speed, dexterity, functional use for ADLs in Rt UE    Activities of Daily Living (ADLs):  · Feeding: Supervision/setup (Lt hand dominant)  · Grooming: Supervision (seated hand hygiene after toileting task, unable to safely complete in standing)  · UB bathing: CGA  · LB bathing: Mod A (reaching distal LEs/buttocks)  · UB dressing: Max A (assist required for threading Rt UE through robe sleeve and tying string)  · LB dressing: Dependent (donning BL socks)  · Toileting: Max A (pt urinated in regular toilet; assist required for mgmt of robe both directions, able to complete seated dana care seated with setup using Lt hand)    Cognitive and Psychosocial Functioning:  · Overall cognitive status: Mildly impaired (history of CP; slightly delayed thought processing, very poor overall insight to current deficits/poor safety awareness)    Balance:   · Sitting: SBA static sitting, CGA/min A dynamic sitting balance with one episode of posterior LOB  · Standing: Mod A     Functional Mobility:  · Bed Mobility: Mod A supine to sitting EOB, Mod A sitting EOB to supine (mod coaching for sequencing steps of task/use of bed rails)  · Transfers: Mod A sit to stand from bed (mod cues for scooting hips forward/pushing through arms), Min A sit to stand from toilet (min cues for use of grab bar)  · Ambulation: Mod A x 2 with hand-held assist to bathroom for toileting; Mod A x 1 with RW bathroom to bed; highly unsteady, unpredictable gait, max guidance required due to visual impairment (See PT note for full gait assessment)      AM-PAC 6 click short form for inpatient daily activity:   How much help from another person does the patient currently need. ..  Unable  Dep A Lot  Max A A Lot Mod A A Little  Min A A Little   CGA  SBA None   Mod I  Indep  Sup   1. Putting on and taking off regular lower body clothing? [x] 1    [] 2   [] 2   [] 3   [] 3   [] 4      2. Bathing (including washing, rinsing, drying)? [] 1   [] 2   [x] 2 [] 3 [] 3 [] 4   3. Toileting, which includes using toilet, bedpan, or urinal? [] 1    [x] 2   [] 2   [] 3   [] 3   [] 4     4. Putting on and taking off regular upper body clothing? [] 1   [x] 2   [] 2   [] 3   [] 3    [] 4      5. Taking care of personal grooming such as brushing teeth? [] 1   [] 2    [] 2 [] 3    [x] 3   [] 4      6. Eating meals? [] 1   [] 2   [] 2   [] 3   [x] 3   [] 4      Raw Score:  13    [24=0% impaired(CH), 23=1-19%(CI), 20-22=20-39%(CJ), 15-19=40-59%(CK), 10-14=60-79%(CL), 7-9=80-99%(CM), 6=100%(CN)]     Treatment:  Self Care Training:   Cues were given for safety, sequence, UE/LE placement, visual cues, and balance. Activities performed today included UB/LB dressing tasks, toileting, seated hand hygiene      Safety Measures: Gait belt used, Left in Bed, Alarm in place    Assessment:  Pt is a 59year old female with a past medical history of Acne, Cerebral palsy ([address] Depression, Diabetes mellitus, type 2 ([address] Environmental allergies, Hyperlipidemia, Hypertension, Legally blind, and Obesity. Pt admitted following a fall at home with muscle spasms. Pt diagnosed with multiple electrolyte abnormalities. Pt with history of CP and is legally blind. Pt lives at home alone but has caregivers for 3 hours/day for 5 days/week. Pt reports she is independent with all ADLs and ambulates mod I with a cane. Pt currently presents with the above impairments, and is highly unsafe/unable to return home at this time. Recommend continued OT services in SNF at d/c. Complexity: High  Prognosis: Good  Plan: 2x/week      Goals:  1. Pt will complete all aspects of bed mobility for EOB/OOB ADLs min A with HOB flat  2.  Pt will complete UB/LB bathing min A with setup using long handled sponge PRN  3. Pt will complete all aspects of LB dressing mod A with setup using AE PRN  4. Pt will complete all functional transfers to and from bed, chair, toilet, shower chair CGA  5. Pt will ambulate HH distance to bathroom for toileting min A with RW  6. Pt will complete all aspects of toileting task mod A  7. Pt will complete ther ex/ther act with focus on UE strengthening, standing tolerance >5 minutes for ADLs  8.  Pt will complete oral hygiene/grooming routine in standing at sink CGA with setup/no seated rest breaks        Time:  Time in: 1437  Time out: 1503  Timed treatment minutes: 11  Total time: 26        Electronically signed by:    Anahi Negro OT/L, [address] [place] [address], [id number]

## 2019-05-09 NOTE — CONSULTS
Consult completed. Procedure/rationale explained to pt including benefits vs potential risks/complications; questions answered & consent obtained. #5fr Double Lumen PowerPICC SOLO initiated to RUE Basilic Vein using sterile, UltraSound-guided technique without difficulty/complications. Sterile dressing with SkinPrep, StatLock Securing Device, Biopatch, SwabCaps, and Limb Precautions band applied. Pt tolerated well & denies other c/o or needs.

## 2019-05-09 NOTE — DISCHARGE INSTR - COC
Equipment (for information only, NOT a DME order):  walker  Other Treatments:     Prognosis: Fair    Condition at Discharge: Stable    Rehab Potential (if transferring to Rehab): Fair    Recommended Labs or Other Treatments After Discharge: BMP to check potassium, calcium, magnesium    Physician Certification: I certify the above information and transfer of Aidee Cornejo  is necessary for the continuing treatment of the diagnosis listed and that she requires [place] for less 30 days.      Update Admission H&P: No change in H&P    PHYSICIAN SIGNATURE:  Electronically signed by Anjana Gant MD on 5/12/19 at 4:42 PM

## 2019-05-09 NOTE — PROGRESS NOTES
.Progress Note (Hospitalist, Internal Medicine)  IDENTIFYING INFORMATION   PATIENT:  Robert Lui  MRN:  [id number]  ADMIT DATE: 5/8/2019  TIME OF EVALUATION: 5/9/2019 8:28 AM      HISTORY OF PRESENT ILLNESS     Robert Lui is a 46 y.o. female presents with falls and unable to get of the floor.     69-year-old female with a history of cerebral palsy who lives alone went to bed last night and woke up on the floor. She is not sure how she ended up on the floor. She had difficulty getting all the floor for which she called emergency services. She denies a previous experience and denies any seizure-like activity. There was no loss of continence. On presentation to the ER patient noted to have what seems to be muscle contractures with tetany. Initial lab workup done showed a significantly abnormal electrolytes. Patient also had elevated CPK. She denies fever, chills, nausea or vomiting. She denies muscle cramps. SUBJECTIVE   She reports feeling better.   She denies any fever, chills, nausea vomiting or any cramps  MEDICATIONS   Medications Prior to Admission  Medications Prior to Admission: venlafaxine (EFFEXOR XR) 150 MG extended release capsule, Take 1 capsule by mouth daily  metFORMIN (GLUCOPHAGE) 500 MG tablet, Take 1 tablet by mouth 2 times daily (with meals)  loratadine (CLARITIN) 10 MG tablet, Take 1 tablet by mouth daily  chlorthalidone (HYGROTON) 25 MG tablet, Take 0.5 tablets by mouth daily  levonorgestrel-ethinyl estradiol (AVIANE) 0.1-20 MG-MCG per tablet, Take 1 tablet by mouth daily  amLODIPine (NORVASC) 5 MG tablet, Take 1 tablet by mouth daily  Alcohol Swabs 70 % PADS, Use twice per day  Lancets MISC, Use twice per day  atorvastatin (LIPITOR) 40 MG tablet, Take 1 tablet by mouth daily  Glucose Blood (BLOOD GLUCOSE TEST STRIPS) STRP, Use twice per day  potassium chloride (KLOR-CON M) 20 MEQ extended release tablet, Take 1 tablet by mouth daily  glucose monitoring kit (FREESTYLE) PRN Adejosseline Jaquez [place], DO   Stopped at 05/09/19 0512    potassium chloride (KLOR-CON) packet 40 mEq  40 mEq Oral PRN Adeyesie Gisele [place], DO        spironolactone (ALDACTONE) tablet 25 mg  25 mg Oral Daily Adejosseline Jaquez [place], DO   25 mg at 05/08/19 2115    calcium carbonate (TUMS) chewable tablet 1,000 mg  1,000 mg Oral Q4H Adejosseline Jaquez [place], DO   1,000 mg at 05/09/19 0308    lidocaine PF 1 % injection 5 mL  5 mL Intradermal Once Estanislado Parrot, APRN - NP        sodium chloride flush 0.9 % injection 10 mL  10 mL Intravenous 2 times per day Estanislado Parrot, APRN - NP   10 mL at 05/08/19 2301    sodium chloride flush 0.9 % injection 10 mL  10 mL Intravenous PRN Estanislado Parrot, APRN - NP             Allergies  No Known Allergies    REVIEW OF SYSTEMS     Within above limitations. 14 point review of systems reviewed. Pertinent positive or negative as per HPI or otherwise negative per 14 point systems review. Reviewed 5/9/2019 at 8:28 AM    PHYSICAL EXAM       Blood pressure (!) 144/92, pulse 100, temperature 98.7 °F (37.1 °C), temperature source Oral, resp. rate 16, height 5' 3\" (1.6 m), weight 174 lb 6.4 oz (79.1 kg), SpO2 96 %, not currently breastfeeding. General - AAO x 3  Psych - Appropriate affect/speech. No agitation  Eyes - patient with strabismus  ENT - Lips wnl. External ear clear/dry/intact. No thyromegaly on inspection  Neuro - No gross peripheral or central neuro deficits on inspection  Heart - Sinus. RRR. S1 and S2 present. No added HS/murmurs appreciated. No elevated JVD appreciated  Lung - Adequate air entry b/l, No crackles/wheezes appreciated  GI - Soft. No guarding/rigidity. No hepatosplenomegaly/ascites. BS+  Skin - Intact. No rash/petechiae/ecchymosis. Warm extremities  MSK - significantly reduced muscle bulk in all extremities worse on the right lower extremity.             Lines/Drains/Airways/Wounds:  [unfilled]    LABS AND IMAGING   CBC  [unfilled]    Last 3

## 2019-05-09 NOTE — CARE COORDINATION
CM into see pt to initiate a safe discharge plan. Cm into see, introduced self and explained role of CM. Pt is alone in room. Pt is very kind, alert and oriented. Pt lives alone. Pt is legally blind, able to only see shadows. Pt has a hired paid caregiver that comes in M-F [phone]. Pt shared that she does the grocery shopping and will assist with meals. Pt DME includes a cane, shower chair. Pt shared that she has seen Oanh Ramos NP. Pt also has Pod Leena Darnell care for SN visits. CM called and confirmed with [facility]. Pt shared that she will be getting a new PCP in July. Pt has insurance and is able to afford her prescriptions. Pt denies any other needs except for her home care. Pt plans to return home when discharged and would like to continue with her home health care. Sara Rae from [facility] and confirmed SN is once a week. CM updated white board and encouraged to call for any needs or concern. CM is available if any needs arise.       1400 CM collaborated with Dr Claudette Domingo and received VO for PT/OT evals. CM placed orders and placed whiteboard.   1500 CM collaborated with PT/OT and recommendations are for SNF placement. CM into see pt and she is agreeable for SNF. CM read options for pt that would accept her insurance. Pt chose [facility]. CM called Levi at [facility], unable to reach. CM called and gave Ton Hahn the message to call CM for referral. CM sent e fax with face sheet. CM remains available to continue with any needs or concerns.

## 2019-05-09 NOTE — PROGRESS NOTES
Physical Therapy  [facility] ACUTE [facility] PHYSICAL THERAPY EVALUATION  [address] [facility] 9, 1970, [id number], 5/9/2019    History  [place]:  The primary encounter diagnosis was Muscle contracture, unspecified site. Diagnoses of Hypokalemia, Hypocalcemia, Hypomagnesemia, Hypophosphatemia, Non-traumatic rhabdomyolysis, and Fall, initial encounter were also pertinent to this visit. Patient  has a past medical history of Acne, Cerebral palsy ([address] Depression, Diabetes mellitus, type 2 ([address] Environmental allergies, Hyperlipidemia, Hypertension, Legally blind, and Obesity. Patient  has a past surgical history that includes eye surgery. Subjective:  Patient states:  \"I'm just exhausted\"  Pain:  Denies   Communication with other providers:  Handoff to RN, co-eval with OT.    Restrictions: general precautions, fall risk, portable tele, IV    Home Setup/Prior level of function  Social/Functional History  Lives With: Alone  Type of Home: Apartment  Home Layout: One level  Home Access: Level entry  Bathroom Shower/Tub: Walk-in shower  Bathroom Toilet: Standard  Bathroom Equipment: Shower chair, Grab bars in shower  Home Equipment: Cane, Alert [employer] Help From: (hired help M-F for ~3 hours for cleaning and grocery shopping)  ADL Assistance: Independent  Homemaking Assistance: Needs assistance(assistance for deep cleaning, pt will manage her cooking and laundry herself)  Ambulation Assistance: Independent(typically Angelito with cane)  Transfer Assistance: Independent  Active : No  Patient's  Info: hired help   [address]: read (Kade)  Additional Comments: 1 fall recently, pt reports \"I got dizzy and just fell\"    Examination of body systems (includes body structures/functions, activity/participation limitations):  · Observation:  Supine in bed upon arrival. Agreeable to therapy with encouragement   · Vision:  Legally blind   · Hearing:  [facility]  · Cardiopulmonary: stable on room air · Orientation: [facility]     Musculoskeletal  · ROM R/L:  Limited ankle ROM bilat (positioned in PF), WFL knee and hip     · Strength R/L:  3+/5 BLEs with exception of ankles (0/5), fair/poor in function and endurance. Mobility/treatment  · Rolling L/R:  modA with facilitation at trunk and guidance of UE to grab bar. · Supine to sit:  modA for assist uprighting trunk and guidance of LEs to EOB  · Sit to supine: modA to guidance of trunk and lifting BLEs. · Transfers: sit><stand from EOB modA, from toilet Eren with use of bed rail. Step pivot without RW modA x 2, with RW modA x 1.   · Sitting balance:  SBA to Eren having 1 LOB posterior sitting EOB during static and light dynamic. · Standing balance:  modA having increased sway in all directions. · Gait: 10ft without AD and HHA modA x 2. 10ft with RW modA x 1. Guidance of walker required with poor vision. Pt unsteady with dec coordination/stability in trunk. No heel strike/toe off, dragging of bilat feet, dec pace, dec step length, bilat LEs ER. Pt reported she used to have bilat braces for feet but she \"grew out of them\"   · Educated on POC, role of PT,DME. VCS for sequencing, UE/LE placement, posture, weight shift, balance. [facility] 6 Clicks Inpatient Mobility:  AM-PAC Inpatient Mobility Raw Score : 11    Safety: patient left in bed w/ alarm, call light within reach, RN notified, gait belt used. Assessment: Body structures, Functions, Activity limitations:Decreased functional mobility ; Decreased ROM; Decreased safe awareness; Decreased endurance; Decreased balance; Decreased high-level IADLs; Decreased coordination; Decreased ADL status; Decreased strength; Decreased vision/visual deficit  Pt is a 46year old female admitted with hypocalcemia, recent fall, and muscle weakness/contractures at hands and feet. Pt with history of CP and is legally blind. Recommend subacute rehab once medically stable.  Prior to admission pt was Angelito with use of SPC for gross mobility and ADLs having hired help for some IADLs and transport. Currently, pt is modA x 1-2 for functionally mobility having significant balance deficits, weakness, and lack of tolerance to activity. Pt functioning well below baseline. No 24/7 available. Pt would benefit from continued therapy to address deficits, dec potential fall risk, and restore PLOF. Complexity: High  Prognosis: Good, no significant barriers to participation at this time. Plan Times per week: 3+/week, 1 week,   Discharge Recommendations: Subacute/Skilled Nursing Facility  Equipment: continue to assess at next level of care     Goals:  Short term goals  Time Frame for Short term goals: 1 week   Short term goal 1: Pt will perform sit><supine SBA   Short term goal 2: Pt will transition sit><stand SBA   Short term goal 3: Pt will transfer to bed/recliner CGA  Short term goal 4: Pt will ambulate 75ft with LRAD CGA        Treatment plan:  Strengthening; Balance Training; Transfer Training; ROM; Functional Mobility Training; ADL/Self-care Training; IADL Training; Endurance Training; Gait Training; Wheelchair Mobility Training; Neuromuscular Re-education; Home Exercise Program; Patient/Caregiver Education & Training; Modalities;  Safety Education & Training; Equipment Evaluation, Education, & procurement; Positioning    Recommendations for NURSING mobility: up to bathroom with use of walker, up to recliner per tolerance     Time:   Time in: 1438  Time out: 1502  Timed treatment minutes: 14  Total time: 24    Electronically signed by:    Jimmy Thomas [id number]  5/9/2019, 3:55 PM

## 2019-05-10 NOTE — PROGRESS NOTES
Nephrology Progress Note        [address], [address] [address]  Phone: [phone]  Office Hours: 8:30AM - 4:30PM  Monday - Friday       [facility]  Vahe Nichols MD  [address], [address],  [place] [address]  PHONE: [phone]  FAX: [phone]    5/10/2019 7:35 AM  Subjective:   Admit Date: 5/8/2019  PCP: No primary care provider on file. Interval History: on room air  On NC  Repots making lots of urine    Diet: DIET CARB CONTROL;      Data:   Scheduled Meds:   amLODIPine  5 mg Oral Daily    atorvastatin  40 mg Oral Daily    venlafaxine  150 mg Oral Daily    sodium chloride flush  10 mL Intravenous 2 times per day    enoxaparin  40 mg Subcutaneous Daily    vitamin D  50,000 Units Oral Weekly    spironolactone  25 mg Oral Daily    calcium carbonate  1,000 mg Oral Q4H    sodium chloride flush  10 mL Intravenous 2 times per day     Continuous Infusions:   0.9% NaCl with KCl 40 mEq 50 mL/hr at 05/10/19 0201     PRN Meds:potassium chloride, sodium phosphate IVPB **OR** sodium phosphate IVPB, sodium chloride flush, magnesium hydroxide, ondansetron, magnesium sulfate, potassium alternative oral replacement, sodium chloride flush  I/O last 3 completed shifts:  In: -   Out: 575 [Urine:575]  No intake/output data recorded.     Intake/Output Summary (Last 24 hours) at 5/10/2019 0735  Last data filed at 5/10/2019 0336  Gross per 24 hour   Intake --   Output 575 ml   Net -575 ml       CBC:   Recent Labs     05/08/19 1116 05/09/19  0558   WBC 13.0* 14.7*   HGB 14.4 13.8   * 449*       BMP:    Recent Labs     05/08/19 1116 05/08/19 2008 05/09/19  0558     --  138   K 2.4  K & CA CALLED TO MABLE CORNELL ON 05/08/2019 @ 1217 BY KAIDEN MLS   RESULTS READ BACK  * 2.3   K CALLED TO HOPE BLACKMON RN AT 2110 ON [id number] BY [facility]   RESULTS READ BACK  * 3.3*   CL 95*  --  96*   CO2 23  --  28   BUN 6  --  4* CREATININE 0.6  --  0.6   GLUCOSE 148*  --  128*     Hepatic:   Recent Labs     05/08/19  1116   AST 38*   ALT 24   BILITOT 0.3   ALKPHOS 78     Troponin: No results for input(s): TROPONINI in the last 72 hours. BNP: No results for input(s): BNP in the last 72 hours. Lipids: No results for input(s): CHOL, HDL in the last 72 hours. Invalid input(s): LDLCALCU  ABGs: No results found for: PHART, PO2ART, VTC5DRV  INR: No results for input(s): INR in the last 72 hours.     Objective:   Vitals: BP (!) 142/89   Pulse 91   Temp 98 °F (36.7 °C) (Oral)   Resp 16   Ht 5' 3\" (1.6 m)   Wt 174 lb 6.4 oz (79.1 kg)   SpO2 96%   BMI 30.89 kg/m²   General appearance: alert and cooperative with exam, in no acute distress  HEENT: normocephalic, atraumatic,   Neck: supple, trachea midline  Lungs: , breathing comfortably on room air  Abdomen: soft, non-tender; non distended,   Extremities: extremities atraumatic, no cyanosis or edema  Neurologic: alert, oriented, follows commands, interactive    Assessment and Plan:     - Rhabdomyolysis: ck in the 4000s  - Hypokalemia: K 2.4 on admission, better, urine lytes suggest K wasting especially in the setting of hypoakemia  - Hypomagnesemia: 0.7 on admission, better  - Hypophosphatemia: 1.1  - Hypocalcemia: 6.7  - Vitamin d deficiency: 25 -oh vitamin d of 7.79, give ergocalciferol  - dm2  - Cerebral palsy  - htn  - proteinuria: up/c 1.1g/day, no further workup for now     Plan:  - am labs pending  - continue ns+40meq kcl/liter  - pth level  - continue aldactone 25mg daily for now  - continue oral mag  - continue tums                              Electronically signed by Byron Davis DO on 5/10/2019 at 7:35 AM    [facility]  MD Jakob Younger [address], [address]  PHONE: [phone]  FAX: [phone]

## 2019-05-10 NOTE — PROGRESS NOTES
.Progress Note (Hospitalist, Internal Medicine)  IDENTIFYING INFORMATION   PATIENT:  Bridget Forbes  MRN:  [id number]  ADMIT DATE: 5/8/2019  TIME OF EVALUATION: 5/10/2019 5:07 PM      HISTORY OF PRESENT ILLNESS     Bridget Forbes is a 46 y.o. female presents with falls and unable to get of the floor.     58-year-old female with a history of cerebral palsy who lives alone went to bed last night and woke up on the floor. She is not sure how she ended up on the floor. She had difficulty getting all the floor for which she called emergency services. She denies a previous experience and denies any seizure-like activity. There was no loss of continence. On presentation to the ER patient noted to have what seems to be muscle contractures with tetany. Initial lab workup done showed a significantly abnormal electrolytes. Patient also had elevated CPK. She denies fever, chills, nausea or vomiting. She denies muscle cramps. SUBJECTIVE   She reports feeling better.   She denies any fever, chills, nausea vomiting or any cramps  MEDICATIONS   Medications Prior to Admission  Medications Prior to Admission: venlafaxine (EFFEXOR XR) 150 MG extended release capsule, Take 1 capsule by mouth daily  metFORMIN (GLUCOPHAGE) 500 MG tablet, Take 1 tablet by mouth 2 times daily (with meals)  loratadine (CLARITIN) 10 MG tablet, Take 1 tablet by mouth daily  chlorthalidone (HYGROTON) 25 MG tablet, Take 0.5 tablets by mouth daily  levonorgestrel-ethinyl estradiol (AVIANE) 0.1-20 MG-MCG per tablet, Take 1 tablet by mouth daily  amLODIPine (NORVASC) 5 MG tablet, Take 1 tablet by mouth daily  Alcohol Swabs 70 % PADS, Use twice per day  Lancets MISC, Use twice per day  atorvastatin (LIPITOR) 40 MG tablet, Take 1 tablet by mouth daily  Glucose Blood (BLOOD GLUCOSE TEST STRIPS) STRP, Use twice per day  potassium chloride (KLOR-CON M) 20 MEQ extended release tablet, Take 1 tablet by mouth daily  glucose monitoring kit (FREESTYLE) [place], DO   50,000 Units at 05/08/19 2037    magnesium sulfate 1 g in dextrose 5% 100 mL IVPB  1 g Intravenous PRN Kvng Jaquez [place], DO   Stopped at 05/09/19 0512    potassium chloride (KLOR-CON) packet 40 mEq  40 mEq Oral PRN Kvng Jaquez [place], DO   40 mEq at 05/09/19 1027    spironolactone (ALDACTONE) tablet 25 mg  25 mg Oral Daily Kvng Jaquez [place], DO   25 mg at 05/10/19 1046    calcium carbonate (TUMS) chewable tablet 1,000 mg  1,000 mg Oral Q4H Kvng Jaquez [place], DO   1,000 mg at 05/10/19 1605    sodium chloride flush 0.9 % injection 10 mL  10 mL Intravenous 2 times per day Hipolito Vargas, APRN - NP   10 mL at 05/09/19 1035    sodium chloride flush 0.9 % injection 10 mL  10 mL Intravenous PRN Hipolito Sam, APRN - NP             Allergies  No Known Allergies    REVIEW OF SYSTEMS     Within above limitations. 14 point review of systems reviewed. Pertinent positive or negative as per HPI or otherwise negative per 14 point systems review. Reviewed 5/10/2019 at 5:07 PM    PHYSICAL EXAM       Blood pressure (!) 138/90, pulse 91, temperature 98 °F (36.7 °C), temperature source Oral, resp. rate 16, height 5' 3\" (1.6 m), weight 174 lb 6.4 oz (79.1 kg), SpO2 97 %, not currently breastfeeding. General - AAO x 3  Psych - Appropriate affect/speech. No agitation  Eyes - patient with strabismus  ENT - Lips wnl. External ear clear/dry/intact. No thyromegaly on inspection  Neuro - No gross peripheral or central neuro deficits on inspection  Heart - Sinus. RRR. S1 and S2 present. No added HS/murmurs appreciated. No elevated JVD appreciated  Lung - Adequate air entry b/l, No crackles/wheezes appreciated  GI - Soft. No guarding/rigidity. No hepatosplenomegaly/ascites. BS+  Skin - Intact. No rash/petechiae/ecchymosis. Warm extremities  MSK - significantly reduced muscle bulk in all extremities worse on the right lower extremity.             Lines/Drains/Airways/Wounds:  [unfilled]    LABS AND IMAGING CBC  [unfilled]    Last 3 Hemoglobin  Lab Results   Component Value Date    HGB 13.7 05/10/2019    HGB 13.8 05/09/2019    HGB 14.4 05/08/2019     Last 3 WBC/ANC  Lab Results   Component Value Date    WBC 16.9 05/10/2019    WBC 14.7 05/09/2019    WBC 13.0 05/08/2019     No components found for: GRNLOCTYABS  Last 3 Platelets  No results found for: PLATELET  Chemistry  [unfilled]  [unfilled]  No results found for: LDH  Coagulation Studies  No results found for: PTT, INR  Liver Function Studies  Lab Results   Component Value Date    ALT 24 05/08/2019    AST 38 05/08/2019    ALKPHOS 78 05/08/2019       Recent Imaging        Relevant labs and imaging reviewed    ASSESSMENT AND PLAN     Rhabdomyolysis  Continue IV fluid resuscitation with daily CPK monitoring    Hypocalcemia, hyperphosphatemia, hypomagnesemia, hypokalemia  Improved, continue calcium as well as phosphorous, magnesium and potassium supplementation.   Urine electrolytes ordered  Urine electrolytes suggestive of potassium wasting  Patient started on Aldactone    Vitamin D deficiency  Patient started on vitamin D supplementation    Nephrology on board    Leukocytosis  Persistent, patient remains afebrile  No intervention at this point    Hypertension  Blood pressure control adequate  Continue amlodipine    PT OT evaluation  Possible discharge to a skilled nursing facility in the [facility], Internal Medicine  5/10/2019 at 5:07 PM

## 2019-05-10 NOTE — PROGRESS NOTES
Physical Therapy  Attempted physical therapy 1245. Patient declining therapy needs at this time. Education given. Will continue to follow.    Lillie Flowers PTA [id number]

## 2019-05-10 NOTE — CARE COORDINATION
CM received VM from Balm at [facility]. CM informed that chart being reviewed.   1010 CM called Levi at [facility]. Pt is approved to go to [facility]. Precert initiated. CM will continue to follow with precert pending. [facility]    1315 CM received call from Balm at [facility]. Pt is approved to go whenever medically cleared. CM placed a PS to Dr Jeanne Bridges to inform. CM completed E PASR on line and placed copy in envelope. Upon discharge pt will be going to SNF at [facility]  Please notify family. Please fax H/P, D/S. Scripts, AVS, and Completed TOBI to [phone]   Please call report to [phone]  Please call pts choice for transportation.  [facility] [phone] or [phone]

## 2019-05-11 NOTE — PROGRESS NOTES
monitoring kit, 1 kit by Does not apply route daily as needed.  Needs talking glucometer    Current Medications  Current Facility-Administered Medications   Medication Dose Route Frequency Provider Last Rate Last Dose    hydrALAZINE (APRESOLINE) 10 mg in sodium chloride 0.9 % 50 mL ivpb  10 mg Intravenous Q6H PRN Lucas Lam MD   Stopped at 05/11/19 0709    potassium phosphate 10 mmol in dextrose 5 % 250 mL IVPB  10 mmol Intravenous Once Juvenal Marcano MD        potassium & sodium phosphates (PHOS-NAK) 280-160-250 MG packet 250 mg  1 packet Oral 4x Daily Kvng Carmen, DO   250 mg at 05/11/19 1612    potassium chloride 10 mEq/100 mL IVPB (Peripheral Line)  10 mEq Intravenous PRN ANETA Slater 100 mL/hr at 05/11/19 1630 10 mEq at 05/11/19 1630    sodium phosphate 13.41 mmol in dextrose 5 % 250 mL IVPB  0.16 mmol/kg Intravenous PRN Myron Crook MD 62.5 mL/hr at 05/11/19 1527 13.41 mmol at 05/11/19 1527    Or    sodium phosphate 26.85 mmol in dextrose 5 % 250 mL IVPB  0.32 mmol/kg Intravenous PRN Myron Crook MD 41.7 mL/hr at 05/08/19 2144      amLODIPine (NORVASC) tablet 5 mg  5 mg Oral Daily Juvenal Marcano MD   5 mg at 05/11/19 0946    atorvastatin (LIPITOR) tablet 40 mg  40 mg Oral Daily Juvenal Marcano MD   40 mg at 05/10/19 2044    venlafaxine (EFFEXOR XR) extended release capsule 150 mg  150 mg Oral Daily Juvenal Marcano MD   150 mg at 05/11/19 0945    sodium chloride flush 0.9 % injection 10 mL  10 mL Intravenous 2 times per day Juvenal Marcano MD   10 mL at 05/11/19 0946    sodium chloride flush 0.9 % injection 10 mL  10 mL Intravenous PRN Juvenal Marcano MD        magnesium hydroxide (MILK OF MAGNESIA) 400 MG/5ML suspension 30 mL  30 mL Oral Daily PRN Juvenal Marcano MD        ondansetron (ZOFRAN) injection 4 mg  4 mg Intravenous Q6H PRN Juvenal Marcano MD        enoxaparin (LOVENOX) injection 40 mg  40 mg Subcutaneous Daily Juvenal Marcano MD   40 mg at 05/11/19 0935    vitamin D extremity. Lines/Drains/Airways/Wounds:  [unfilled]    LABS AND IMAGING   CBC  [unfilled]    Last 3 Hemoglobin  Lab Results   Component Value Date    HGB 13.9 05/11/2019    HGB 13.7 05/10/2019    HGB 13.8 05/09/2019     Last 3 WBC/ANC  Lab Results   Component Value Date    WBC 11.4 05/11/2019    WBC 16.9 05/10/2019    WBC 14.7 05/09/2019     No components found for: GRNLOCTYABS  Last 3 Platelets  No results found for: PLATELET  Chemistry  [unfilled]  [unfilled]  No results found for: LDH  Coagulation Studies  No results found for: PTT, INR  Liver Function Studies  Lab Results   Component Value Date    ALT 24 05/08/2019    AST 38 05/08/2019    ALKPHOS 78 05/08/2019       Recent Imaging        Relevant labs and imaging reviewed    ASSESSMENT AND PLAN     Rhabdomyolysis  Continue IV fluid resuscitation with daily CPK monitoring    Hypocalcemia, hyperphosphatemia, hypomagnesemia, hypokalemia  Improved, continue calcium as well as phosphorous, magnesium and potassium supplementation.   Urine electrolytes ordered  Urine electrolytes suggestive of potassium wasting  Patient started on Aldactone    Potassium and phosphorous still low, calcium improved    Vitamin D deficiency  Patient started on vitamin D supplementation    Nephrology on board    Leukocytosis  Persistent, patient remains afebrile  No intervention at this point    Hypertension  Blood pressure control adequate  Continue amlodipine    PT OT evaluation  Possible discharge to a skilled nursing facility in the [facility], Internal Medicine  5/11/2019 at 6:11 PM

## 2019-05-11 NOTE — PLAN OF CARE
Problem: Falls - Risk of:  Goal: Will remain free from falls  Description  Will remain free from falls  5/10/2019 2350 by Derrick Bertrand RN  Outcome: Ongoing  5/10/2019 1546 by Dimitri De La Paz RN  Outcome: Ongoing  Goal: Absence of physical injury  Description  Absence of physical injury  5/10/2019 2350 by Derrick Bertrand RN  Outcome: Ongoing  5/10/2019 1546 by Dimitri De La Paz RN  Outcome: Ongoing

## 2019-05-11 NOTE — PROGRESS NOTES
[facility] [address], [address] [address]  Phone: [phone]  Office Hours: 8:30AM - 4:30PM  Monday - Friday     Covering for Dr Bryce West  Awake alert  Labs reviewed  K 3.2  Mag normal  Calcium better  Will continue supplements as ordered  Recheck labs in am

## 2019-05-12 NOTE — PROGRESS NOTES
[address] [address], [address] [address]  Phone: [phone]  Office Hours: 8:30AM - 4:30PM  Monday - Friday     Awake alert  On replacement protocol  Will add KPHOS IV 20 mmol  Recheck labs in am

## 2019-05-12 NOTE — PLAN OF CARE
Problem: Falls - Risk of:  Goal: Will remain free from falls  Description  Will remain free from falls  5/11/2019 2233 by Gael Sultana RN  Outcome: Ongoing  5/11/2019 1413 by Molly Brar RN  Outcome: Ongoing  Goal: Absence of physical injury  Description  Absence of physical injury  5/11/2019 2233 by Gael Sultana RN  Outcome: Ongoing  5/11/2019 1413 by Molly Brar RN  Outcome: Ongoing

## 2019-05-12 NOTE — PROGRESS NOTES
.Progress Note (Hospitalist, Internal Medicine)  IDENTIFYING INFORMATION   PATIENT:  Shadi Renae  MRN:  [id number]  ADMIT DATE: 5/8/2019  TIME OF EVALUATION: 5/12/2019 4:44 PM      HISTORY OF PRESENT ILLNESS     Shadi Renae is a 46 y.o. female presents with falls and unable to get of the floor.     49-year-old female with a history of cerebral palsy who lives alone went to bed last night and woke up on the floor. She is not sure how she ended up on the floor. She had difficulty getting all the floor for which she called emergency services. She denies a previous experience and denies any seizure-like activity. There was no loss of continence. On presentation to the ER patient noted to have what seems to be muscle contractures with tetany. Initial lab workup done showed a significantly abnormal electrolytes. Patient also had elevated CPK. She denies fever, chills, nausea or vomiting. She denies muscle cramps. SUBJECTIVE   She reports feeling better.   She denies any fever, chills, nausea vomiting or any cramps  MEDICATIONS   Medications Prior to Admission  Medications Prior to Admission: venlafaxine (EFFEXOR XR) 150 MG extended release capsule, Take 1 capsule by mouth daily  metFORMIN (GLUCOPHAGE) 500 MG tablet, Take 1 tablet by mouth 2 times daily (with meals)  loratadine (CLARITIN) 10 MG tablet, Take 1 tablet by mouth daily  chlorthalidone (HYGROTON) 25 MG tablet, Take 0.5 tablets by mouth daily  levonorgestrel-ethinyl estradiol (AVIANE) 0.1-20 MG-MCG per tablet, Take 1 tablet by mouth daily  amLODIPine (NORVASC) 5 MG tablet, Take 1 tablet by mouth daily  Alcohol Swabs 70 % PADS, Use twice per day  Lancets MISC, Use twice per day  atorvastatin (LIPITOR) 40 MG tablet, Take 1 tablet by mouth daily  Glucose Blood (BLOOD GLUCOSE TEST STRIPS) STRP, Use twice per day  potassium chloride (KLOR-CON M) 20 MEQ extended release tablet, Take 1 tablet by mouth daily  glucose monitoring kit (FREESTYLE) monitoring kit, 1 kit by Does not apply route daily as needed.  Needs talking glucometer    Current Medications  Current Facility-Administered Medications   Medication Dose Route Frequency Provider Last Rate Last Dose    hydrALAZINE (APRESOLINE) 10 mg in sodium chloride 0.9 % 50 mL ivpb  10 mg Intravenous Q6H PRN Austyn Lee MD   Stopped at 05/11/19 0709    potassium chloride 10 mEq/100 mL IVPB (Peripheral Line)  10 mEq Intravenous PRN Kandice Crook,  mL/hr at 05/11/19 1630 10 mEq at 05/11/19 1630    sodium phosphate 13.41 mmol in dextrose 5 % 250 mL IVPB  0.16 mmol/kg Intravenous PRN Fabiana Mac MD   Stopped at 05/11/19 1937    Or    sodium phosphate 26.85 mmol in dextrose 5 % 250 mL IVPB  0.32 mmol/kg Intravenous PRN Fabiana Mac MD 41.7 mL/hr at 05/08/19 2144      amLODIPine (NORVASC) tablet 5 mg  5 mg Oral Daily Gisselle Cox MD   5 mg at 05/12/19 1010    atorvastatin (LIPITOR) tablet 40 mg  40 mg Oral Daily Gisselle Cox MD   40 mg at 05/11/19 2003    venlafaxine (EFFEXOR XR) extended release capsule 150 mg  150 mg Oral Daily Gisselle Cox MD   150 mg at 05/12/19 1010    sodium chloride flush 0.9 % injection 10 mL  10 mL Intravenous 2 times per day Gisselle Cox MD   10 mL at 05/11/19 2004    sodium chloride flush 0.9 % injection 10 mL  10 mL Intravenous PRN Gisselle Cox MD        magnesium hydroxide (MILK OF MAGNESIA) 400 MG/5ML suspension 30 mL  30 mL Oral Daily PRN Gisselle Cox MD        ondansetron (ZOFRAN) injection 4 mg  4 mg Intravenous Q6H PRN Gisselle Cox MD        enoxaparin (LOVENOX) injection 40 mg  40 mg Subcutaneous Daily Gisselle Cox MD   40 mg at 05/12/19 1010    vitamin D (ERGOCALCIFEROL) capsule 50,000 Units  50,000 Units Oral Weekly Kvng Carmen DO   50,000 Units at 05/08/19 2037    magnesium sulfate 1 g in dextrose 5% 100 mL IVPB  1 g Intravenous PRN Kvng Carmen DO   Stopped at 05/09/19 0512    potassium chloride (KLOR-CON) packet 40 mEq 40 mEq Oral PRN Adejosseline Jaquez Clarkton, DO   40 mEq at 05/09/19 1027    spironolactone (ALDACTONE) tablet 25 mg  25 mg Oral Daily Kvng Jaquez Clarkton, DO   25 mg at 05/12/19 1010    calcium carbonate (TUMS) chewable tablet 1,000 mg  1,000 mg Oral Q4H Kvng Jaquez Clarkton, DO   1,000 mg at 05/12/19 1400    sodium chloride flush 0.9 % injection 10 mL  10 mL Intravenous 2 times per day Galina Ashley, APRN - NP   10 mL at 05/12/19 1011    sodium chloride flush 0.9 % injection 10 mL  10 mL Intravenous PRN Galina Ashley, APRN - NP             Allergies  No Known Allergies    REVIEW OF SYSTEMS     Within above limitations. 14 point review of systems reviewed. Pertinent positive or negative as per HPI or otherwise negative per 14 point systems review. Reviewed 5/12/2019 at 4:44 PM    PHYSICAL EXAM       Blood pressure (!) 152/90, pulse 104, temperature 97.4 °F (36.3 °C), temperature source Oral, resp. rate 17, height 5' 3\" (1.6 m), weight 174 lb 6.4 oz (79.1 kg), SpO2 95 %, not currently breastfeeding. General - AAO x 3  Psych - Appropriate affect/speech. No agitation  Eyes - patient with strabismus  ENT - Lips wnl. External ear clear/dry/intact. No thyromegaly on inspection  Neuro - No gross peripheral or central neuro deficits on inspection  Heart - Sinus. RRR. S1 and S2 present. No added HS/murmurs appreciated. No elevated JVD appreciated  Lung - Adequate air entry b/l, No crackles/wheezes appreciated  GI - Soft. No guarding/rigidity. No hepatosplenomegaly/ascites. BS+  Skin - Intact. No rash/petechiae/ecchymosis. Warm extremities  MSK - significantly reduced muscle bulk in all extremities worse on the right lower extremity.             Lines/Drains/Airways/Wounds:  [unfilled]    LABS AND IMAGING   CBC  [unfilled]    Last 3 Hemoglobin  Lab Results   Component Value Date    HGB 13.9 05/12/2019    HGB 13.9 05/11/2019    HGB 13.7 05/10/2019     Last 3 WBC/ANC  Lab Results   Component Value Date    WBC 8.9

## 2019-05-13 NOTE — DISCHARGE SUMMARY
Discharge Summary    Name:  Jennifer Jones /Age/Sex: 1970  (46 y.o. female)   MRN & CSN:  [id number] & [id number] Admission Date/Time: 2019 11:01 AM   Attending:  Oumou Larios MD Discharging Physician: Oumou Larios MD     Hospital Course:     Discharge diagnoses    Rhabdomyolysis  Hypocalcemia  Hypophosphatemia  Hypomagnesemia  Hypokalemia  Vitamin D deficiency  Leukocytosis  Hypertension      Maricel Wilburn is a 46 y.o. female presents with falls and unable to get of the floor.     57-year-old female with a history of cerebral palsy who lives alone and is legally blind went to bed the night prior to presentation and woke up on the floor.  She is not sure how she ended up on the floor.  She had difficulty getting off the floor for which she called emergency services. Corky Ped denies a previous experience and denies any seizure-like activity.  There was no loss of continence. On presentation to the ER patient noted to have what seems to be muscle contractures with tetany. Initial lab workup done showed a significantly abnormal electrolytes.  Patient also had elevated CPK. She denies fever, chills, nausea or vomiting.  She denies muscle cramps. Patient was admitted and received electrolytes supplementation with normalization of her electrolytes. There was concern potassium wasting on consultation with nephrology for which she was started on Aldactone. Patient had rhabdomyolysis which resolved with IV fluids. She remained hemodynamically stable however due to her difficulty with ambulation patient being discharged to a skilled nursing facility for short-term rehab. Her blood pressure was up properly manage. The patient expressed appropriate understanding of and agreement with the discharge recommendations, medications, and plan.      Consults this admission:  IP CONSULT TO HOSPITALIST  IP CONSULT TO NEPHROLOGY    Discharge Instruction:   Follow up appointments:   Primary care physician: within 2 weeks    Diet:  cardiac diet   Activity: activity as tolerated  Disposition: Discharged to:   []Home, []C, [x]SNF, []Acute Rehab, []Hospice   Condition on discharge: Stable    Discharge Medications:      Rosa Wilburn [facility] Medication Instructions [id number]    Printed on:05/13/19 3995   Medication Information                      Alcohol Swabs 70 % PADS  Use twice per day             amLODIPine (NORVASC) 5 MG tablet  Take 1 tablet by mouth daily             atorvastatin (LIPITOR) 40 MG tablet  Take 1 tablet by mouth daily             chlorthalidone (HYGROTON) 25 MG tablet  Take 0.5 tablets by mouth daily             Glucose Blood (BLOOD GLUCOSE TEST STRIPS) STRP  Use twice per day             glucose monitoring kit (FREESTYLE) monitoring kit  1 kit by Does not apply route daily as needed. Needs talking glucometer             Lancets MISC  Use twice per day             levonorgestrel-ethinyl estradiol (AVIANE) 0.1-20 MG-MCG per tablet  Take 1 tablet by mouth daily             loratadine (CLARITIN) 10 MG tablet  Take 1 tablet by mouth daily             metFORMIN (GLUCOPHAGE) 500 MG tablet  Take 1 tablet by mouth 2 times daily (with meals)             potassium chloride (KLOR-CON M) 20 MEQ extended release tablet  Take 1 tablet by mouth daily             spironolactone (ALDACTONE) 50 MG tablet  Take 1 tablet by mouth daily             venlafaxine (EFFEXOR XR) 150 MG extended release capsule  Take 1 capsule by mouth daily                 Objective Findings at Discharge:   BP (!) 142/101 Comment: manual  Pulse 104   Temp 98.9 °F (37.2 °C) (Oral)   Resp 18   Ht 5' 3\" (1.6 m)   Wt 174 lb 6.4 oz (79.1 kg)   SpO2 91%   BMI 30.89 kg/m²            PHYSICAL EXAM    General - AAO x 3  Psych - Appropriate affect/speech. No agitation  Eyes - patient with strabismus legally blind  ENT - Lips wnl. External ear clear/dry/intact.  No thyromegaly on inspection  Neuro - No gross peripheral or central neuro

## 2019-05-13 NOTE — CARE COORDINATION
CM informed that pt is ready for discharge. Envelope in soft chart. Caitlin set up transport for 5p.m. CM called informed Allen Maker at [facility] that pt was going to be discharged.  [facility]

## 2019-05-13 NOTE — PROGRESS NOTES
Occupational Therapy      Attempted at 1100 hrs, c pt reporting nausea and BP noted at 183/113, , in supine. [facility] and plan is to re-attempt later this date as schedule allows. Nurse reports plan to administer BP meds.         Electronically signed by:    JORDI Rodriguez  5/13/2019, 10:59 AM

## 2019-05-13 NOTE — PROGRESS NOTES
- doing well   - on room air    A&Plan:     - Rhabdomyolysis: ck in the 4000s, better  - Hypokalemia: K 2.4 on admission, better, urine lytes suggest K wasting especially in the setting of hypoakemia  - Hypomagnesemia: 0.7 on admission, better  - Hypophosphatemia: 1.1  - Hypocalcemia: 6.7, pth appropriately high at 151 in the setting of low calcium  - Vitamin d deficiency: 22 -oh vitamin d of 7.79, give ergocalciferol  - dm2  - Cerebral palsy  - htn  - proteinuria: up/c 1.1g/day, no further workup for now     Plan:  - labs are better  - no tums on dc  - continue aldactone on dc, will increase to 50mg daily for better bp control  - outpt follow up

## 2019-05-13 NOTE — PROGRESS NOTES
Physical Therapy    Physical Therapy Treatment Note  Name: Thelma Hernandez MRN: [id number] :   1970   Date:  2019   Admission Date: 2019 Room:  [address] [place], [place] [address]   Restrictions/Precautions:         General precautions, fall risk  Communication with other providers: okay to do tx per SHANON Mujica. Communicated with RN and decided will be given some medication to bring down her BP. Subjective:  Patient states:  \" I don't feel comfortable leaving. \"; : I feel nauseous. \"  Pain:   Location, Type, Intensity (0/10 to 10/10):  No c/o pain  Objective:    Observation:  Supine flat bed. Agreeable to tx. Treatment, including education/measures:  Vitals:  BP:  183/113 HR:  116  O2 96%    Education:  Pt was instructed on Purpose of Exercise Program, Reza Scale and Signs and Symptoms of Exercise Intolerance    Safety  Patient left safely in the supine, with call light/phone in reach with alarm applied. Gait belt was used for transfers and gait. Assessment / Impression:  BP too high for mobility at this time. Patient's tolerance of treatment:  N/A   Adverse Reaction: N/A  Significant change in status and impact:  N/A  Barriers to improvement:  BP, strength, balance and safety     Plan for Next Session:    Cont POC.    Time in:  1055  Time out:  1105  Timed treatment minutes:  10  Total treatment time:  10    Previously filed items:  Social/Functional History  Lives With: Alone  Type of Home: Apartment  Home Layout: One level  Home Access: Level entry  Bathroom Shower/Tub: Walk-in shower  Bathroom Toilet: Standard  Bathroom Equipment: Shower chair, Grab bars in [address] [place], [place] [address]vard: [address] [facility],[address] Help From: (hired help M-F for ~3 hours for cleaning and grocery shopping)  ADL Assistance: Independent  Homemaking Assistance: Needs assistance(assistance for deep cleaning, pt will manage her cooking and laundry herself)  Ambulation Assistance: Independent(typically Angelito with cane)  Transfer Assistance: Independent  Active : No  Patient's  Info: hired help   Leisure & Hobbies: read (Kade)  Additional Comments: 1 fall recently, pt reports \"I got dizzy and just fell\"  Short term goals  Time Frame for Short term goals: 1 week   Short term goal 1: Pt will perform sit><supine SBA   Short term goal 2: Pt will transition sit><stand SBA   Short term goal 3: Pt will transfer to bed/recliner CGA  Short term goal 4: Pt will ambulate 75ft with LRAD CGA        Electronically signed by:    Ida Dominguez PTA  5/13/2019, 10:47 AM

## 2019-05-13 NOTE — PROGRESS NOTES
Physical Therapy    Physical Therapy Treatment Note  Name: Shadi Renae MRN: [id number] :   1970   Date:  2019   Admission Date: 2019 Room:  [address] [place], [place] [address]   Restrictions/Precautions:        General precautions, fall risk, Blind  Communication with other providers: okay to do tx per SHANON Mujica. Co-treat [place] Lu  Subjective:  Patient states:  \"I feel nauseous still. \" \" I don't feel like eating. \" \" I haven't drank or ate all day. \" \"I got up last week once. \"  Pain:   Location, Type, Intensity (0/10 to 10/10):  No c/o pain  Objective:    Observation:  supine. Agreeable to tx. Treatment, including education/measures:  Vitals:  BP:  Sup 143/94 HR: 112; HOB elevated 140/90; /88 HR: 133 recheck a. 138/94  b. 148/90  sup   O2 room air    Therapeutic Activity:  Edgardo x2 sup>sit vc/tc proper technique, sequencing and safety to improve bed mobility  ModA x 2 sit>stand vc/tc proper technique, sequencing and safety to improve bed mobility  ModA +Edgardo scoot>EOB vc/tc proper technique, sequencing and safety to improve bed mobility  CGA sitting balance ~ 10 min vc/tc proper technique, posture, WS and safety to improve functional mobility  Comments: Pt demonstrated blind and needed tc/vc cues for transfers. Pt decrease tolerance for activity    Gait: Declined OOB activity. Pt state [place]. \"    Exercises:  Education:  Pt was instructed on Purpose of Exercise Program, Reza Scale and Signs and Symptoms of Exercise Intolerance    Ankle Pumps/ Heel Raises x 10 supine    HR to high and mot appropriate for exercises  vc/tc  proper technique to improve ROM and strength     Safety  Patient left safely in the supine in bed, with call light/phone in reach with alarm applied. Gait belt was used for transfers and gait. Assessment / Impression:   Pt has decreased tolerance to activity from initial evaluation.  Pt declined OOB activity with 2 therapist present and RW     Patient's tolerance of treatment:  fair   Adverse Reaction: increased BP and high BPM  Significant change in status and impact:  none  Barriers to improvement:  strength, balance and safety     Plan for Next Session:    Cont POC.  Rec SNF  Time in:  1350  Time out:  1318  Timed treatment minutes:  28  Total treatment time:  28    Previously filed items:  Social/Functional History  Lives With: Alone  Type of Home: Apartment  Home Layout: One level  Home Access: Level entry  Bathroom Shower/Tub: Walk-in shower  Bathroom Toilet: Standard  Bathroom Equipment: Shower chair, Grab bars in [place]: [address] [facility],[address] Help From: (hired help M-F for ~3 hours for cleaning and grocery shopping)  ADL Assistance: Independent  Homemaking Assistance: Needs assistance(assistance for deep cleaning, pt will manage her cooking and laundry herself)  Ambulation Assistance: Independent(typically Angelito with cane)  Transfer Assistance: Independent  Active : No  Patient's  Info: hired help   [address]: read (Kade)  Additional Comments: 1 fall recently, pt reports \"I got dizzy and just fell\"  Short term goals  Time Frame for Short term goals: 1 week   Short term goal 1: Pt will perform sit><supine SBA   Short term goal 2: Pt will transition sit><stand SBA   Short term goal 3: Pt will transfer to bed/recliner CGA  Short term goal 4: Pt will ambulate 75ft with LRAD CGA        Electronically signed by:    Maureen Dennis PTA  5/13/2019, 2:20 PM

## 2019-05-13 NOTE — PROGRESS NOTES
Report giving to Nico Ward at [facility] and informed her that transportation is expected to arrive at 1700.

## 2019-05-13 NOTE — PLAN OF CARE
Problem: Falls - Risk of:  Goal: Will remain free from falls  Description  Will remain free from falls  5/13/2019 0236 by Elenita Colvin RN  Outcome: Ongoing  5/12/2019 1653 by Dayo Miller RN  Outcome: Ongoing  Goal: Absence of physical injury  Description  Absence of physical injury  5/13/2019 0236 by Elenita Colvin RN  Outcome: Ongoing  5/12/2019 1653 by Dayo Miller RN  Outcome: Ongoing

## 2019-05-13 NOTE — PROGRESS NOTES
Occupational Therapy    Occupational Therapy Treatment Note  Name: Yoly Landeros MRN: [id number] :   1970   Date:  2019   Admission Date: 2019 Room:  [facility]     Restrictions/Precautions:    General precautions; fall risk    Communication with other providers:  Nurse Haydee Chapin cleared pt for Tx session. Co-Tx c PTA [place]. Notified Nurse at end of Tx session of pt's report of continued nausea and dizziness, and status of vitals as noted below. Subjective:  Patient states:  Pt agreeable to co-Tx. \"I still feel nauseous. \" and pt reports not eating or taking in fluids this date. Fluids offered and encouraged. Pt reports no increase in nausea or dizziness during Tx session. Pain:   Location, Type, Intensity (0/10 to 10/10):  0/10, denies pain    Objective:    Observation:  Pt received in semi-fowlers. Objective Measures:  Vitals monitored throughout Tx session. Supine c HOB elevated: Initial /90 and . Seated EOB: /88, ; rechecks 138/94, , and 148/90, . Pt exhibits no outward s/s of dizziness, no emesis, requires increased time between movements. Treatment, including education:  Therapeutic Activity Training:   Therapeutic activity training was instructed today. Cues were given for safety, sequence, UE/LE placement, awareness, and balance. Activities performed today included bed mobility training, sup-sit. Rolling R<>L:   Supine to sit: Min A x2  Scooting: Mod A + Min A  Sit to supine: Mod A x2  Sitting balance / tolerance:   Pt required verbal / physical cues for safe body positioning / sequencing. Pt educated for role of OT and benefits of transfer training, including partial sit<>stands from EOB. Pt declined this date, identifying limiting factors as nausea and dizziness.     All therapeutic intervention performed c emphasis on bed mobility, dynamic balance / sitting  tolerance to inc strength, endurance and act tolerance for inc Indep c ADL tasks, and in preparation for func transfers / mobility. Pt verbalized understanding for all education. Safety  Patient safely in bed + alarm at end of session, with call light/phone in reach, and nursing aware. Gait belt for safety. Assessment / Impression:        Patient's tolerance of treatment:  Fair   Adverse Reaction: None  Significant change in status and impact: From evaluation, pt is not able to tolerate functional transfers this date, d/t pt's decreased act tolerance related to nausea, dizziness during Tx session. Barriers to improvement: Decreased vision (legally blind); decreased strength and act tolerance; nausea and dizziness    Plan for Next Session:    Continue per OT POC c emphasis on functional transfer training and to address ADLs. Time in:  1358  Time out:  1418  Timed treatment minutes:  20  Total treatment time: 20 minutes  Electronically signed by:    JORDI Barrera  5/13/2019, 2:19 PM    Previously filed values:         Goals:  1. Pt will complete all aspects of bed mobility for EOB/OOB ADLs min A with HOB flat  2. Pt will complete UB/LB bathing min A with setup using long handled sponge PRN  3. Pt will complete all aspects of LB dressing mod A with setup using AE PRN  4. Pt will complete all functional transfers to and from bed, chair, toilet, shower chair CGA  5. Pt will ambulate HH distance to bathroom for toileting min A with RW  6. Pt will complete all aspects of toileting task mod A  7. Pt will complete ther ex/ther act with focus on UE strengthening, standing tolerance >5 minutes for ADLs  8.  Pt will complete oral hygiene/grooming routine in standing at sink CGA with setup/no seated rest breaks

## 2019-06-03 NOTE — ED NOTES
1900 paged Dr Jim Zepeda  06/03/19 1901 1908 paged Dr Jordyn Jaime covering Dr Jim Zepeda  06/03/19 1908

## 2019-06-03 NOTE — ED NOTES
Charles Chairez, nurse at [facility] notified of death. Attempted to consult Emergency Contact but phone number is no longer active. No other phone numbers listed for family.      Ale Gutierrez RN  06/03/19 [id number]

## 2019-06-03 NOTE — ED NOTES
Bed: 03TR-03  Expected date:   Expected time:   Means of arrival:   Comments:  arrest     Mary JOON Tucker  06/03/19 [id number]

## 2019-06-03 NOTE — ED PROVIDER NOTES
Triage Chief Complaint:   Cardiac Arrest (active CPR upon arrival)      [place]:  Sen Sharp is a 46 y.o. female that presents to the emergency department as a full arrest. He shouldn't apparently was from [facility] and was called as a low oxygen low blood pressure. A basic crew did arrive and stated that she coded when they got there. They placed a [place] device and were bagging her. No one was able to intubate her nor get a line. She has received no medications prior to arrival. She has already been down for approximately 25-30 minutes prior to arrival in the emergency department. . Patient is extremely pale, unresponsive. 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    Obesity      Past Surgical History:   Procedure Laterality Date    EYE SURGERY      baby     Family History   Problem Relation Age of Onset    Other Mother         sarcoid    High Blood Pressure Father     Other Brother         Mentally disabled     Social History     Socioeconomic History    Marital status: Single     Spouse name: Not on file    Number of children: Not on file    Years of education: Not on file    Highest education level: Not on file   Occupational History    Not on file   Social Needs    Financial resource strain: Not on file    Food insecurity:     Worry: Not on file     Inability: Not on file    Transportation needs:     Medical: Not on file     Non-medical: Not on file   Tobacco Use    Smoking status: Never Smoker    Smokeless tobacco: Never Used   Substance and Sexual Activity    Alcohol use: No    Drug use: No    Sexual activity: Never   Lifestyle    Physical activity:     Days per week: Not on file     Minutes per session: Not on file    Stress: Not on file Relationships    Social connections:     Talks on phone: Not on file     Gets together: Not on file     Attends [employer] service: Not on file     Active member of club or organization: Not on file     Attends meetings of clubs or organizations: Not on file     Relationship status: Not on file    Intimate partner violence:     Fear of current or ex partner: Not on file     Emotionally abused: Not on file     Physically abused: Not on file     Forced sexual activity: Not on file   Other Topics Concern    Not on file   Social History Narrative    Not on file     No current facility-administered medications for this encounter. Current Outpatient Medications   Medication Sig Dispense Refill    spironolactone (ALDACTONE) 50 MG tablet Take 1 tablet by mouth daily 30 tablet 3    loratadine (CLARITIN) 10 MG tablet Take 1 tablet by mouth daily 30 tablet 0    chlorthalidone (HYGROTON) 25 MG tablet Take 0.5 tablets by mouth daily 15 tablet 5    levonorgestrel-ethinyl estradiol (AVIANE) 0.1-20 MG-MCG per tablet Take 1 tablet by mouth daily 28 tablet 11    venlafaxine (EFFEXOR XR) 150 MG extended release capsule Take 1 capsule by mouth daily 30 capsule 5    amLODIPine (NORVASC) 5 MG tablet Take 1 tablet by mouth daily 30 tablet 5    metFORMIN (GLUCOPHAGE) 500 MG tablet Take 1 tablet by mouth 2 times daily (with meals) 60 tablet 5    Alcohol Swabs 70 % PADS Use twice per day 100 each 11    Lancets MISC Use twice per day 100 each 11    atorvastatin (LIPITOR) 40 MG tablet Take 1 tablet by mouth daily 30 tablet 11    Glucose Blood (BLOOD GLUCOSE TEST STRIPS) STRP Use twice per day 100 strip 11    potassium chloride (KLOR-CON M) 20 MEQ extended release tablet Take 1 tablet by mouth daily 30 tablet 11    glucose monitoring kit (FREESTYLE) monitoring kit 1 kit by Does not apply route daily as needed.  Needs talking glucometer 1 kit 0     No Known Allergies  Nursing Notes Reviewed    ROS:  Unable to obtain Physical Exam:  ED Triage Vitals   Enc Vitals Group      BP       Pulse       Resp       Temp       Temp src       SpO2       Weight       Height       Head Circumference       Peak Flow       Pain Score       Pain Loc       Pain Edu? Excl. in [address]? My pulse oximetry interpretation is which is abnormal. Being bagged. GENERAL APPEARANCE: Unresponsive. Ramon device in place giving chest compressions, being bagged by EMS. HEAD: Normocephalic. Atraumatic. EYES: Pupils fixed and unresponsive to light. ENT: Mucous membranes are dry, extremely pale. White gums. Tolerates saliva. No trismus. NECK: Supple. No meningismus. Trachea midline. HEART:pulseless. LUNGS: equal breath sounds with bagging, equal chest rise. ABDOMEN: Soft. No guarding or rebound. Umbilical hernia. Distended, likely from air secondary to being bagged. EXTREMITIES: No acute deformities. SKIN: Skin very pale. Cap refill greater than 3. No pulses. NEUROLOGICAL:GCS 3, being bagged  PSYCHIATRIC: n/a     I have reviewed and interpreted all of the currently available lab results from this visit (if applicable):  Results for orders placed or performed during the hospital encounter of 06/03/19   POCT Glucose   Result Value Ref Range    POC Glucose 254 (H) 70 - 99 MG/DL        Radiographs:  [] Radiologist's Wet Read Report Reviewed:     [] Discussed with Radiologist:     [] The following radiograph was interpreted by myself in the absence of a radiologist:     EKG: (All EKG's are interpreted by myself in the absence of a cardiologist)      MDM:  Patient is in full arrest. Has been down for 25-30 minutes prior to arrival. She was being bagged by a BVM. Ramon device in place giving compressions. Intraosseous line placed immediately by RN to left humerus. Did flush easily. Patient immediately given bicarbonate, epinephrine, calcium IV. CPR was continued. Patient immediately intubated successfully by myself. CPR continued.  Received a total of 2 A of bicarb, 3 epinephrines, one calcium chloride, dextrose. Accu-Chek was 250. No cardiac activity confirmed on 3 separate occasions with ultrasound. Remains pulseless the entire time she is here. Time of death called at 6:44 PM.    Clinical Impression:  1. Cardiopulmonary arrest ([facility] [address]        Disposition Vitals:  [unfilled], [unfilled], [unfilled], [unfilled]    Disposition referral (if applicable):  No follow-up provider specified.     Disposition medications (if applicable):  New Prescriptions    No medications on file         (Please note that portions of this note may have been completed with a voice recognition program. Efforts were made to edit the dictations but occasionally words are mis-transcribed.)    MD Isamar Thomas MD  06/03/19 [id number]

## 2019-06-04 ENCOUNTER — TELEPHONE (OUTPATIENT)
Dept: FAMILY MEDICINE CLINIC | Age: 49
End: 2019-06-04

## 2019-06-04 NOTE — TELEPHONE ENCOUNTER
Received call from ER around 7 pm last night that patient had coded at [facility] and brought in by squad. Most likely cardiac arrest.  Asking me to get sign death certificate.

## 2019-06-06 ENCOUNTER — TELEPHONE (OUTPATIENT)
Dept: FAMILY MEDICINE CLINIC | Age: 49
End: 2019-06-06

## 2019-06-06 NOTE — TELEPHONE ENCOUNTER
The  said that the hospital told him you were patients doctor. I told him patient has not been seen since July 2018 and has multiple no show and canceled appointments. Patient had new patient appointment scheduled with Dr Cesar Valente in July 2019. He said that he will need to see her past records from our office even though she was a none compliant patient with multiple no shows and cancellations.  He said that he can ask the hospital to sign death certificate

## 2019-06-06 NOTE — TELEPHONE ENCOUNTER
Rex Louie from [facility] called he has been asked to review patients medical records. Daja Sandoval is faxing a medical record release to our office. Pat asked if you will sign the Death Certificate or do you want the hospital to. The hospital stated natural death.

## 2023-01-09 NOTE — PROGRESS NOTES
Nephrology Progress Note        [address], [address] [address]  Phone: [phone]  Office Hours: 8:30AM - 4:30PM  Monday - Friday       [facility]  Marylen Hawthorn, MD  [address],  [place] [facility] [address]  PHONE: [phone]  FAX: [phone]    5/9/2019 7:24 AM  Subjective:   Admit Date: 5/8/2019  PCP: No primary care provider on file. Interval History: on room air  No complaints today    Diet: DIET CARB CONTROL;      Data:   Scheduled Meds:   magnesium oxide  400 mg Oral BID    amLODIPine  5 mg Oral Daily    atorvastatin  40 mg Oral Daily    venlafaxine  150 mg Oral Daily    sodium chloride flush  10 mL Intravenous 2 times per day    enoxaparin  40 mg Subcutaneous Daily    vitamin D  50,000 Units Oral Weekly    spironolactone  25 mg Oral Daily    calcium carbonate  1,000 mg Oral Q4H    lidocaine PF  5 mL Intradermal Once    sodium chloride flush  10 mL Intravenous 2 times per day     Continuous Infusions:   0.9% NaCl with KCl 40 mEq 100 mL/hr at 05/08/19 2218     PRN Meds:potassium chloride, sodium phosphate IVPB **OR** sodium phosphate IVPB, sodium chloride flush, magnesium hydroxide, ondansetron, magnesium sulfate, potassium alternative oral replacement, sodium chloride flush  I/O last 3 completed shifts: In: 10 [I.V.:10]  Out: -   No intake/output data recorded.     Intake/Output Summary (Last 24 hours) at 5/9/2019 0724  Last data filed at 5/8/2019 2301  Gross per 24 hour   Intake 10 ml   Output --   Net 10 ml       CBC:   Recent Labs     05/08/19  1116 05/09/19  0558   WBC 13.0* 14.7*   HGB 14.4 13.8   * 449*       BMP:    Recent Labs     05/08/19 1116 05/08/19 2008 05/09/19  0558     --  138   K 2.4  K & CA CALLED TO MABLE CORNELL ON 05/08/2019 @ 1217 BY KAIDEN [facility]   RESULTS READ BACK  * 2.3   K CALLED TO HOPE BLACKMON RN AT 2110 ON [id number] BY [facility]   RESULTS READ BACK  * 3.3* CL 95*  --  96*   CO2 23  --  28   BUN 6  --  4*   CREATININE 0.6  --  0.6   GLUCOSE 148*  --  128*     Hepatic:   Recent Labs     05/08/19  1116   AST 38*   ALT 24   BILITOT 0.3   ALKPHOS 78     Troponin: No results for input(s): TROPONINI in the last 72 hours. BNP: No results for input(s): BNP in the last 72 hours. Lipids: No results for input(s): CHOL, HDL in the last 72 hours. Invalid input(s): LDLCALCU  ABGs: No results found for: PHART, PO2ART, KUM1MFP  INR: No results for input(s): INR in the last 72 hours.     Objective:   Vitals: BP (!) 144/92   Pulse 100   Temp 98.7 °F (37.1 °C) (Oral)   Resp 16   Ht 5' 3\" (1.6 m)   Wt 174 lb 6.4 oz (79.1 kg)   SpO2 96%   BMI 30.89 kg/m²   General appearance: alert and cooperative with exam, in no acute distress  HEENT: normocephalic, atraumatic,   Neck: supple, trachea midline  Lungs: clear to auscultation bilaterally, breathing comfortably on room air  Heart[de-identified] regular rate and rhythm, S1, S2 normal,  Abdomen: soft, non-tender; non distended  Extremities: no edema, RUE contracture  Neurologic: alert,  follows commands, interactive    Assessment and Plan:     - Rhabdomyolysis: ck in the 4000s  - Hypokalemia: K 2.4 on admission, better  - Hypomagnesemia: 0.7 on admission, better  - Hypophosphatemia: 1.1  - Hypocalcemia: 6.7  - Vitamin d deficiency: 25 -oh vitamin d of 7.79, give ergocalciferol  - dm2  - Cerebral palsy  - htn  - proteinuria     Plan:  - severe hypomagnesemia is known to cause hypocalcemia by interacting with pth secretion  - continue ns+40meq kcl/liter  - up/c 1.1g/day   - check pth level  - urine lytes suggest K wasting especially in the setting of hypoakemia  - continue aldactone 25mg daily for now  - continue oral mag  - continue tums  - add calcitriol today for few doses                          Electronically signed by Maddison Kelly DO on 5/9/2019 at 7:24 AM    [address],  [place]  [facility], located within [facility], [place] [address]  PHONE: [phone]  FAX: [phone] Bilobed Flap Text: The defect edges were debeveled with a #15 scalpel blade.  Given the location of the defect and the proximity to free margins a bilobe flap was deemed most appropriate.  Using a sterile surgical marker, an appropriate bilobe flap drawn around the defect.    The area thus outlined was incised deep to adipose tissue with a #15 scalpel blade.  The skin margins were undermined to an appropriate distance in all directions utilizing iris scissors.